# Patient Record
Sex: MALE | Race: WHITE | NOT HISPANIC OR LATINO | Employment: UNEMPLOYED | URBAN - METROPOLITAN AREA
[De-identification: names, ages, dates, MRNs, and addresses within clinical notes are randomized per-mention and may not be internally consistent; named-entity substitution may affect disease eponyms.]

---

## 2018-08-29 ENCOUNTER — TELEPHONE (OUTPATIENT)
Dept: BEHAVIORAL HEALTH | Facility: CLINIC | Age: 43
End: 2018-08-29

## 2018-08-29 NOTE — TELEPHONE ENCOUNTER
8-29-18 Scheduled cd eval mpls for client 9-5-18 @ [id number].  He is interested in Tx (IOP).  Crack, Cocaine, Meth  Hx cd tx, last time last Oct.  MH: none  Medical: none  Legal: none    Requested gisella. fb

## 2018-09-05 ENCOUNTER — HOSPITAL ENCOUNTER (OUTPATIENT)
Dept: BEHAVIORAL HEALTH | Facility: CLINIC | Age: 43
Discharge: HOME | End: 2018-09-05
Attending: SOCIAL WORKER | Admitting: SOCIAL WORKER
Payer: COMMERCIAL

## 2018-09-05 VITALS
HEART RATE: 86 BPM | DIASTOLIC BLOOD PRESSURE: 86 MMHG | HEIGHT: 73 IN | BODY MASS INDEX: 31.94 KG/M2 | WEIGHT: 241 LBS | SYSTOLIC BLOOD PRESSURE: 119 MMHG

## 2018-09-05 PROCEDURE — H0001 ALCOHOL AND/OR DRUG ASSESS: HCPCS

## 2018-09-05 ASSESSMENT — ANXIETY QUESTIONNAIRES
3. WORRYING TOO MUCH ABOUT DIFFERENT THINGS: MORE THAN HALF THE DAYS
7. FEELING AFRAID AS IF SOMETHING AWFUL MIGHT HAPPEN: NEARLY EVERY DAY
6. BECOMING EASILY ANNOYED OR IRRITABLE: NEARLY EVERY DAY
GAD7 TOTAL SCORE: 17
1. FEELING NERVOUS, ANXIOUS, OR ON EDGE: MORE THAN HALF THE DAYS
2. NOT BEING ABLE TO STOP OR CONTROL WORRYING: MORE THAN HALF THE DAYS
5. BEING SO RESTLESS THAT IT IS HARD TO SIT STILL: NEARLY EVERY DAY
4. TROUBLE RELAXING: MORE THAN HALF THE DAYS

## 2018-09-05 ASSESSMENT — PAIN SCALES - GENERAL: PAINLEVEL: NO PAIN (0)
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
## 2018-09-05 NOTE — TELEPHONE ENCOUNTER
"MARLEEAR  Name:   Eric Escobar YOB: 1975 Age:  43 year old Gender:  male   Insurance:   [employer]: PMAP. Paperwork was faxed to  for pre-auth today.   Precipitating Event:   Treatment due to own awareness of need for help, Treatment due to pressure from family members to get help, Treatment to avoid loss of relationship(s) and Treatment to avoid loss of living situation   DOC:   Cocaine/Crack  Additional abused substances:   Alcohol, Marijuana and Meth/Amphetamines   Medical:   heartburn   Mental Health:   History of trauma and/or abuse issues and Rule out bipolar and depression.   Previous Detox admissions & CD treatments:  4 prior IP detoxification admission(s).  13 prior CD treatments, completed all but one.   Psychosocial:  Single, in a serious relationship  3 adult child(nuha) and 1 minor child(nuha)  Homeless  Tendency to isolate from others, Marital or relationship conflict with significant other due to substance abuse, Relationship conflict with family members or friends due to substance abuse, History of legal issues, Unemployed and Significant debt   [place]ice Suicide Risk Status:    Past month:1. - Low Risk: Evaluation Counselors:  Document in Epic / [email or web address] to counselor \"Low Risk\".      Treatment Counselors:  Reassess upon admission as applicable, assess weekly in progress notes under Dimension 3 and summarize in Discharge / Treatment summary under Dimension 3.  Past 24 hours:1. - Low Risk: Evaluation Counselors:  Document in Epic / [email or web address] to counselor \"Low Risk\".      Treatment Counselors:  Reassess upon admission as applicable, assess weekly in progress notes under Dimension 3 and summarize in Discharge / Treatment summary under Dimension 3.     Additional Info as needed: There was no addtional information to provide at this time.  Please see the above suicide risk rating information.         "

## 2018-09-05 NOTE — TELEPHONE ENCOUNTER
LP SCREEN TELEPHONE NOTE   Eric VALENCIA Shawn paperwork was reviewed by KIRTI Rai and the patient was deemed ELIGIBLE for the LP program.     Medical: The patient is medically stable and did not appear to need a medical screeening with the LP RN at this time.     Insurance: [employer] [place] and we are waiting for [facility] to authorize 21 days of LP.  Eval was faxed to  today for pre-authorization.      This will be a: VA, ISP & LP UPDATE evaluation.     IV: This patient is not an IV drug user.     Business office: The patient has [employer] MA/PMAP and would NOT need to consult with anyone in the business office about the out of pocket costs of the LP program.     List: This patient CAN NOT be placed on the FACE to FACE LP Waiting List until after we have received the auth from .     Group: [facility] Group, preferably Group A     Additional Info as needed: NA     The best current contact telephone number for the patient is: [phone].       Thanks,  KIRTI Rai   9/5/2018
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
## 2018-09-06 ASSESSMENT — PATIENT HEALTH QUESTIONNAIRE - PHQ9: SUM OF ALL RESPONSES TO PHQ QUESTIONS 1-9: 19

## 2018-09-06 ASSESSMENT — ANXIETY QUESTIONNAIRES: GAD7 TOTAL SCORE: 17

## 2018-09-06 NOTE — TELEPHONE ENCOUNTER
LP bed available.  Eric will admit on Monday 9/10 at 1000.  Reminded to have a 30 day supply of all RX meds, all over the counter need to be unopened, also need to be clean and sober and not at risk for withdrawal.

## 2018-09-10 ENCOUNTER — HOSPITAL ENCOUNTER (OUTPATIENT)
Dept: BEHAVIORAL HEALTH | Facility: CLINIC | Age: 43
End: 2018-09-10
Attending: FAMILY MEDICINE
Payer: COMMERCIAL

## 2018-09-10 VITALS — HEART RATE: 85 BPM | TEMPERATURE: 98.2 F | DIASTOLIC BLOOD PRESSURE: 77 MMHG | SYSTOLIC BLOOD PRESSURE: 131 MMHG

## 2018-09-10 PROCEDURE — 10020000 ZZH LODGING PLUS FACILITY CHARGE ADULT

## 2018-09-10 PROCEDURE — H2035 A/D TX PROGRAM, PER HOUR: HCPCS | Mod: HQ

## 2018-09-10 PROCEDURE — H2035 A/D TX PROGRAM, PER HOUR: HCPCS

## 2018-09-10 RX ORDER — AMOXICILLIN 250 MG
2 CAPSULE ORAL DAILY PRN
COMMUNITY
End: 2018-10-05

## 2018-09-10 RX ORDER — MAGNESIUM HYDROXIDE/ALUMINUM HYDROXICE/SIMETHICONE 120; 1200; 1200 MG/30ML; MG/30ML; MG/30ML
30 SUSPENSION ORAL EVERY 6 HOURS PRN
COMMUNITY
End: 2018-10-05

## 2018-09-10 RX ORDER — LORATADINE 10 MG/1
10 TABLET ORAL DAILY PRN
COMMUNITY
End: 2018-10-05

## 2018-09-10 ASSESSMENT — ANXIETY QUESTIONNAIRES
5. BEING SO RESTLESS THAT IT IS HARD TO SIT STILL: NEARLY EVERY DAY
2. NOT BEING ABLE TO STOP OR CONTROL WORRYING: SEVERAL DAYS
6. BECOMING EASILY ANNOYED OR IRRITABLE: MORE THAN HALF THE DAYS
1. FEELING NERVOUS, ANXIOUS, OR ON EDGE: SEVERAL DAYS
7. FEELING AFRAID AS IF SOMETHING AWFUL MIGHT HAPPEN: NOT AT ALL
GAD7 TOTAL SCORE: 11
3. WORRYING TOO MUCH ABOUT DIFFERENT THINGS: NEARLY EVERY DAY
4. TROUBLE RELAXING: SEVERAL DAYS

## 2018-09-10 NOTE — PROGRESS NOTES
[facility] Nursing Health Assessment      Vital signs:     /77 (BP Location: Left arm)  Pulse 85  Temp 98.2  F (36.8  C)      Direct admission    Counselor: Ky  Drug of Choice: Crack cocaine, Meth   Last use: 9/2/18  Home clinic/MD:[facility], Dr Tidwell   [facility]     Medical history/current conditions: Acid reflux    H&P Screen:  H&P within the last 90 days: Yes.  Date: 6/7/18 Location: [facility]       Mental [facility] diagnosis: Depression and anxiety   Medication compliant?: N/A  Recent sucidal thoughts? No     When? N/A  Current thought of self-harm? No    Plan? N/A    Pain assessment:   Pt. Experiencing pain at this time?  Yes.  Rating on 0-10 scale: (1-10 scale): 7.  Location: lower back   Recent  Result of: working on car .       Nursing Assessment Summary:  Medically stable, no acute concern at this time    On-going nursing intervention required?   No    Acute care visit recommended: no     Cristina Herrera RN

## 2018-09-10 NOTE — PROGRESS NOTES
"  Progress Note       This patient had a comprehensive assessment on 9/5/2018 completed by TIA Dove.  This patient was seen for a face to face update of the comprehensive assessment on 9/10/2018 by KIRTI Rai:  Yes    Alcohol/Drug use since the last CD evaluation (include date of last use):     No additional substances use since the last CD evaluation     Please note any other clinical changes since the last CD evaluation (such as medication changes, additional legal charges, detoxification admissions, overdoses, etc.)     No significant changes since the last CD evaluation       ASAM Dimensions Original scores Current Scores   I.) Intoxication and Withdrawal: 0 0   II.) Biomedical:  0 0   III.) Emotional and Behavioral:  2 2   IV.) Readiness to Change:  1 1   V.) Relapse Potential: 4 4   VI.) Recovery Environmental: 3 3     Please list clinical justifications for the above ASAM score changes since the original comprehensive assessment:     None of the ASAM scores on the six dimensions had changed since the original comprehensive assessment was completed on 9/5/2018.       Current KASHMIR: Current UA:     .000     Positive for THC and negative for all other screened drugs.         PHQ-9, ELYSIA-7   PHQ-9 on 9/10/2018 ELYSIA-7 on 9/10/2018   The patient's PHQ-9 score was 12 out of 27, indicating moderate depression.     The patient's ELYSIA-7 score was 11 out of 21, indicating moderate anxiety.         [place]-Suicide Severity Rating Scale Reassessment   Have you ever wished you were dead or that you could go to sleep and not wake up?  Past Month:  No, not since he saw me on 9/5/2018   Have you actually had any thoughts of killing yourself?  Past Month:  No   Have you been thinking about how you might do this?     Past Month:  No Lifetime:  Yes, Describe: see below   Have you had these thoughts and had some intention of acting on them?     Past Month:  No Lifetime:  Yes, Describe: \"I was " "just going to get out of the car, and sit on the bench and use my .\" This is when Norma took him to the ED after she saw him playing with his . This was a few months ago.   Have you started to work out the details of how to kill yourself?   Past Month:  No Lifetime:  Yes, Describe: see above   Do you intend to carry out this plan?   No   When you have the thoughts how long do they last?  Fleeting - few seconds or minutes   Are there things - anyone or anything (i.e. family, Mosque, pain of death) that stopped you from wanting to die or acting on thoughts of suicide?  Protective factors probably stopped you     2008  [employer]  Used with permission by Serenity Ridley, PhD.       Guide to C-SSRS Risk Ratings   NO IDEATION:  with no active thoughts IDEATION: with a wish to die. IDEATION: with active thoughts. Risk Ratings   If Yes No No 0 - Very Low Risk   If NA Yes No 1 - Low Risk   If NA Yes Yes 2 - Low/moderate risk   IDEATION: associated thoughts of methods without intent or plan INTENT: Intent to follow through on suicide PLAN: Plan to follow through on suicide Risk Ratings cont...   If Yes No No 3 - Moderate Risk   If Yes Yes No 4 - High Risk   If Yes Yes Yes 5 - High Risk   The patient's ADDITIONAL RISK FACTORS and lack of PROTECTIVE FACTORS may increase their overall suicide risk ratings.     Additional Risk Factors:    Tendency to be socially isolated and/or cut off from the support of others     A recent death of someone close to the patient and/or unresolved grief and loss issues     Significant history of trauma and/or abuse issues     History of impulsive or aggressive behaviors   Protective Factors:    Having people in his/her life that would prevent the patient from considering a suicide attempt (i.e. young children, spouse, parents, etc.)     A positive relationship with his/her clinical medical and/or mental health providers     Having easy " "access to supportive family members     Having cultural, Catholic or spiritual beliefs that discourage suicide        Risk Status   Past month:1. - Low Risk: Evaluation Counselors:  Document in Epic / SBAR to counselor \"Low Risk\".      Treatment Counselors:  Reassess upon admission as applicable, assess weekly in progress notes under Dimension 3 and summarize in Discharge / Treatment summary under Dimension 3.     Past 24 hours:1. - Low Risk: Evaluation Counselors:  Document in Epic / SBAR to counselor \"Low Risk\".      Treatment Counselors:  Reassess upon admission as applicable, assess weekly in progress notes under Dimension 3 and summarize in Discharge / Treatment summary under Dimension 3.   Additional information to support suicide risk rating: There was no addtional information to provide at this time.  Please see the above suicide risk rating information.          "
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
## 2018-09-10 NOTE — PROGRESS NOTES
Initial Services Plan        Service Initiation Date: 9/10/2018    Immediate health and/or safety concerns: No    Identify health and safety concern(s) below and include plan to address:    Notify nurse if out of medical supplies.    Client issues to be addressed in the first treatment sessions:     Fear of being with strangers  Fear of adjusting to roommate or different environment  Fear of failing  Fear of being in a group and/or speaking in front of people    Treatment suggestions for client during the time between intake (admit date) and completion of the individual treatment plan:     Look for a sober support network, i.e. 12 step, [facility], [facility], etc  Tour the treatment center or outpatient clinic  Introduce yourself to your treatment group. Spend time getting to know your peers  Review your patient or client handbook  Begin working on your treatment goal list      Completed by: KIRTI Rai  Date completed: 9/10/2018 at 8:38 AM

## 2018-09-10 NOTE — PROGRESS NOTES
Name: Eric Escobar  Date: 9/10/2018  Medical Record: [id number]    Envelope Number: [id number]    List of Contents (List each item separately in new row):     One cell phone with case  One     Admission:  I am responsible for any personal items that are not sent to the safe or pharmacy.  [facility] is not responsible for loss, theft or damage of any property in my possession.      Patient Signature:  ___________________________________________       Date/Time:__________________________    Staff Signature: __________________________________       Date/Time:__________________________    2nd Staff person, if patient is unable/unwilling to sign:      __________________________________________________________       Date/Time: __________________________      Discharge:  [facility] has returned all of my personal belongings:    Patient Signature: ________________________________________     Date/Time: ____________________________________    Staff Signature: ______________________________________     Date/Time:_____________________________________

## 2018-09-11 ENCOUNTER — HOSPITAL ENCOUNTER (OUTPATIENT)
Dept: BEHAVIORAL HEALTH | Facility: CLINIC | Age: 43
End: 2018-09-11
Attending: FAMILY MEDICINE
Payer: COMMERCIAL

## 2018-09-11 DIAGNOSIS — Z71.6 ENCOUNTER FOR SMOKING CESSATION COUNSELING: Primary | ICD-10-CM

## 2018-09-11 PROBLEM — F19.20 CHEMICAL DEPENDENCY (H): Status: ACTIVE | Noted: 2018-09-11

## 2018-09-11 LAB — BARBITURATES UR QL: NEGATIVE

## 2018-09-11 PROCEDURE — H2035 A/D TX PROGRAM, PER HOUR: HCPCS

## 2018-09-11 PROCEDURE — H2035 A/D TX PROGRAM, PER HOUR: HCPCS | Mod: HQ

## 2018-09-11 PROCEDURE — 80299 QUANTITATIVE ASSAY DRUG: CPT | Performed by: FAMILY MEDICINE

## 2018-09-11 PROCEDURE — 10020000 ZZH LODGING PLUS FACILITY CHARGE ADULT

## 2018-09-11 PROCEDURE — 80307 DRUG TEST PRSMV CHEM ANLYZR: CPT | Performed by: FAMILY MEDICINE

## 2018-09-11 RX ORDER — POLYETHYLENE GLYCOL 3350 17 G
POWDER IN PACKET (EA) ORAL
Qty: 100 LOZENGE | Refills: 2 | Status: SHIPPED | OUTPATIENT
Start: 2018-09-11

## 2018-09-11 ASSESSMENT — PATIENT HEALTH QUESTIONNAIRE - PHQ9: SUM OF ALL RESPONSES TO PHQ QUESTIONS 1-9: 12

## 2018-09-11 ASSESSMENT — ANXIETY QUESTIONNAIRES: GAD7 TOTAL SCORE: 11

## 2018-09-12 ENCOUNTER — HOSPITAL ENCOUNTER (OUTPATIENT)
Dept: BEHAVIORAL HEALTH | Facility: CLINIC | Age: 43
End: 2018-09-12
Attending: FAMILY MEDICINE
Payer: COMMERCIAL

## 2018-09-12 PROCEDURE — H2035 A/D TX PROGRAM, PER HOUR: HCPCS | Mod: HQ

## 2018-09-12 PROCEDURE — 10020000 ZZH LODGING PLUS FACILITY CHARGE ADULT
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
## 2018-09-12 NOTE — PROGRESS NOTES
"Patient Safety Plan Template    Name:   Eric Escobar YOB: 1975 Age:  43 year old MR Number:  [id number]   Step 1: Warning signs (Thoughts, images, mood, situation, behavior) that a crisis may be developin. Irritable.     2. Anxious     3. Not talking.     Step 2: Internal coping strategies - Things I can do to take my mind off of my problems without contacting another person (relaxation technique, physical activity):     1. Reading     2. Meditating     3. Exercising.     Step 3: People and social settings that provide distraction:     1. Name: Eric Henderson   Phone: [phone]   2. Name: Norma Nelson   Phone: [phone]   3. Place: [place]   4. Place: Gym     The one thing that is most important to me and worth living for is: My partner, children and pets.     Step 4: People whom I can ask for help:     1. Name: Norma   Phone: [phone]     2. Name: Eric   Phone: [phone]     3. Name: Olivier   Phone: [phone]     Step 5: Professionals or agencies I can contact during a crisis:     1. Clinician Name: Greta (mental health therapist)   Phone: Patient reports phone number is stored in his cell phone.   Clinician Pager or Emergency Contact #: NA     2. Clinician Name: ADITI   Phone: ADITI     Clinician Pager or Emergency Contact #: NA     3. Local Urgent Care Services: [facility].    Urgent Care Services Address:     Urgent Care Services Phone: NA     4. Suicide Prevention Lifeline Phone: 5-424-042-WMMO (3075)     Step 6: Making the environment safe:     1. \"Being around sober people\"     2. \"Staying sober\"     Safety Plan Template 2008 Yoko Kirk and Alcon Brewster is reprinted with the express permission of the authors.  No portion of the Safety Plan Template may be reproduced without the express, written permission.  You can contact the authors at [email or web address] or juan [email or web address].     Patient denies any suicide and/or self " harm ideation at this time.

## 2018-09-13 ENCOUNTER — HOSPITAL ENCOUNTER (OUTPATIENT)
Dept: BEHAVIORAL HEALTH | Facility: CLINIC | Age: 43
End: 2018-09-13
Attending: FAMILY MEDICINE
Payer: COMMERCIAL

## 2018-09-13 PROCEDURE — H2035 A/D TX PROGRAM, PER HOUR: HCPCS | Mod: HQ

## 2018-09-13 PROCEDURE — H2035 A/D TX PROGRAM, PER HOUR: HCPCS

## 2018-09-13 PROCEDURE — 10020000 ZZH LODGING PLUS FACILITY CHARGE ADULT

## 2018-09-14 ENCOUNTER — HOSPITAL ENCOUNTER (OUTPATIENT)
Dept: BEHAVIORAL HEALTH | Facility: CLINIC | Age: 43
End: 2018-09-14
Attending: FAMILY MEDICINE
Payer: COMMERCIAL

## 2018-09-14 LAB
BUPRENORPHINE GLUCURONIDE URINE: <5 NG/ML
BUPRENORPHINE UR CFM-MCNC: <2 NG/ML
NALOXONE URINE: <100 NG/ML
NORBUPRENORPHINE GLUCURONIDE URINE: <5 NG/ML
NORBUPRENORPHINE UR CFM-MCNC: <2 NG/ML

## 2018-09-14 PROCEDURE — H2035 A/D TX PROGRAM, PER HOUR: HCPCS

## 2018-09-14 PROCEDURE — 10020000 ZZH LODGING PLUS FACILITY CHARGE ADULT

## 2018-09-14 PROCEDURE — H2035 A/D TX PROGRAM, PER HOUR: HCPCS | Mod: HQ

## 2018-09-14 NOTE — PROGRESS NOTES
AFTERCARE NOTE  9/14/18    Writer met with patient today to discuss his aftercare plans. Pt stated that his previous approach after treatment of putting employment before his recovery has never worked. Pt shared he is open to looking at both residential and out-patient care. I spoke to pt about both the [facility] and [facility]'s residential programs. Pt stated he would like to take a couple days to think about his options and get back to me. Updates to follow.    Bebeto Tucker/ [facility]   [facility]

## 2018-09-15 ENCOUNTER — HOSPITAL ENCOUNTER (OUTPATIENT)
Dept: BEHAVIORAL HEALTH | Facility: CLINIC | Age: 43
End: 2018-09-15
Attending: FAMILY MEDICINE
Payer: COMMERCIAL

## 2018-09-15 PROCEDURE — 10020000 ZZH LODGING PLUS FACILITY CHARGE ADULT

## 2018-09-15 PROCEDURE — H2035 A/D TX PROGRAM, PER HOUR: HCPCS

## 2018-09-16 ENCOUNTER — HOSPITAL ENCOUNTER (OUTPATIENT)
Dept: BEHAVIORAL HEALTH | Facility: CLINIC | Age: 43
End: 2018-09-16
Attending: FAMILY MEDICINE
Payer: COMMERCIAL

## 2018-09-16 PROCEDURE — 10020000 ZZH LODGING PLUS FACILITY CHARGE ADULT

## 2018-09-16 PROCEDURE — H2035 A/D TX PROGRAM, PER HOUR: HCPCS | Mod: HQ

## 2018-09-17 ENCOUNTER — HOSPITAL ENCOUNTER (OUTPATIENT)
Dept: BEHAVIORAL HEALTH | Facility: CLINIC | Age: 43
End: 2018-09-17
Attending: FAMILY MEDICINE
Payer: COMMERCIAL

## 2018-09-17 PROCEDURE — H2035 A/D TX PROGRAM, PER HOUR: HCPCS | Mod: HQ

## 2018-09-17 PROCEDURE — 10020000 ZZH LODGING PLUS FACILITY CHARGE ADULT

## 2018-09-17 PROCEDURE — H2035 A/D TX PROGRAM, PER HOUR: HCPCS

## 2018-09-17 NOTE — TELEPHONE ENCOUNTER
A phone call was made as follow-up to the Family Program mailing for the week of 09/24/2018. Message left or spoke in person to individuals on BENSON.

## 2018-09-17 NOTE — PROGRESS NOTES
Acknowledgement of Current Treatment Plan - Initial Treatment Plan     INITIAL TREATMENT PLAN:     1. I have participated in creating my treatment plan with my counselor on September 17, 2018.  I agree with the plan as it is written in the electronic health record.    Name Signature/Date   Eric ERIK Escobar        Name of  Counselor     Signature/Date   KIRTI Merritt      2. I have completed and reviewed my Safety Plan with my counselor and signed this on _________. I have been given the hard copy of this plan.    Patient signature/date:      _____________________________________________________________________________    3. Last Use Date: __________    Patient signature/date:     _____________________________________________________________________________                                                    Acknowledgement of Current Treatment Plan - Additional Entries       ADDITIONAL GOALS AND INTERVENTIONS:    Signatures/dates required for any additional Problems, Goals, and/or Interventions added to treatment plan:  Change/Addition in Dimension ____ on date:_________  Insert here:         I have participated in creating this change and/or addition to my treatment plan copied above.   I have been given a copy of this signature page with change/addition to my treatment plan and I am in agreement with how it is written in the electronic record.       Patient signature:       ________________________________________________________________________      Counselor/Therapist signature:    ________________________________________________________________              Change in date of last use:       ________________________________________________________________        Patient signature/date (required for change in last use date):     ________________________________________________________________
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
## 2018-09-18 ENCOUNTER — HOSPITAL ENCOUNTER (OUTPATIENT)
Dept: BEHAVIORAL HEALTH | Facility: CLINIC | Age: 43
End: 2018-09-18
Attending: FAMILY MEDICINE
Payer: COMMERCIAL

## 2018-09-18 PROCEDURE — H2035 A/D TX PROGRAM, PER HOUR: HCPCS

## 2018-09-18 PROCEDURE — 10020000 ZZH LODGING PLUS FACILITY CHARGE ADULT

## 2018-09-18 PROCEDURE — H2035 A/D TX PROGRAM, PER HOUR: HCPCS | Mod: HQ

## 2018-09-19 ENCOUNTER — HOSPITAL ENCOUNTER (OUTPATIENT)
Dept: BEHAVIORAL HEALTH | Facility: CLINIC | Age: 43
End: 2018-09-19
Attending: FAMILY MEDICINE
Payer: COMMERCIAL

## 2018-09-19 PROCEDURE — 10020000 ZZH LODGING PLUS FACILITY CHARGE ADULT

## 2018-09-19 PROCEDURE — H2035 A/D TX PROGRAM, PER HOUR: HCPCS | Mod: HQ

## 2018-09-19 PROCEDURE — H2035 A/D TX PROGRAM, PER HOUR: HCPCS

## 2018-09-19 NOTE — PROGRESS NOTES
AFTERCARE UPDATE  9/19/18    Per patient's request, pt's clinicals were faxed today to the [facility] program. I await their response.    Bebeto Tucker/ [facility]   [facility]
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
## 2018-09-20 ENCOUNTER — HOSPITAL ENCOUNTER (OUTPATIENT)
Dept: BEHAVIORAL HEALTH | Facility: CLINIC | Age: 43
End: 2018-09-20
Attending: FAMILY MEDICINE
Payer: COMMERCIAL

## 2018-09-20 PROCEDURE — 10020000 ZZH LODGING PLUS FACILITY CHARGE ADULT

## 2018-09-20 PROCEDURE — H2035 A/D TX PROGRAM, PER HOUR: HCPCS

## 2018-09-20 PROCEDURE — H2035 A/D TX PROGRAM, PER HOUR: HCPCS | Mod: HQ

## 2018-09-20 NOTE — PROGRESS NOTES
AFTERCARE UPDATE  9/20/18     RE: Eric KENNEDY- Writer met with patient today for any aftercare updates pt might have. Pt stated he is looking at out-patient care and is considering pursuing this through [facility]. I gave pt the [facility] out-patient schedule and asked him to consider his options. I also asked pt to meet with his primary counselors on 9/21/18 to give them updates and get their feedback. Pt stated he would do this.    Bebeto Tucker/ [facility]   [facility]

## 2018-09-21 ENCOUNTER — HOSPITAL ENCOUNTER (OUTPATIENT)
Dept: BEHAVIORAL HEALTH | Facility: CLINIC | Age: 43
End: 2018-09-21
Attending: FAMILY MEDICINE
Payer: COMMERCIAL

## 2018-09-21 PROCEDURE — 10020000 ZZH LODGING PLUS FACILITY CHARGE ADULT

## 2018-09-21 PROCEDURE — H2035 A/D TX PROGRAM, PER HOUR: HCPCS

## 2018-09-21 PROCEDURE — H2035 A/D TX PROGRAM, PER HOUR: HCPCS | Mod: HQ

## 2018-09-21 NOTE — PROGRESS NOTES
CASE MANAGEMENT NOTE    This writer met with patient regarding aftercare recommendations.  Patient reports [facility] is not an option and patient reported an interest in [facility] in [place], [place].  Patient signed a release of information for [facility] and this writer sent information to Admission Department.    KIRTI Merritt

## 2018-09-21 NOTE — PROGRESS NOTES
Patient:  Eric Escobar            Adult CD Progress Note and Treatment Plan Review     Attendance  Please refer to OP BEH CD Adult Attendance Record Documentation Flowsheet    Support group attended this week: yes    Reporting sobriety:  yes    Treatment Plan     Treatment Plan Review competed on: 9/21/2018       Client preferred learning style: Hands on  Verbal  Reading    Staff Members contributing MICHELLE Merritt & KIRTI Sher                     Received Supervision: No    Client: contributed to goals and plan.    Client received copy of plan/revised plan: Yes    Client agrees with plan/revised plan: Yes        Changes to Treatment Plan: No    New Goals added since last review No additional goals added at this time.    Goals worked on since last review Patient completed and presented his initial assignment 'Drug Progression' in group processes.    Strategies effective: yes    .Strategies need these changes: No additional strategies needed at this time/    1) Care Coordination Activities:  Patient is working with counseling staff to establish aftercare recommendations.  2) Medical, Mental Health and other appointments the client attended: Patient met with staff mental health therapist.  3) Medication issues: No issues to address at this time.  4) Physical and mental health problems: No issues to address at this time.  5) Any changes in Vulnerable Adult Status?  No If yes, add to treatment plan and individual abuse prevention plan.  6) Review and evaluation of the individual abuse prevention plan: Current IAPP for this program is adequate for this client          ASAM Risk Rating:    Dimension 1 0 Patient reports a last use date of 9/2/2018.  No issues to address at this time.    Dimension 2 0 Patient denies any biomedical concerns that would interfere with full participation in group and treatment processes.  Patient reports he is prescribed medications and is currently  medication compliant.  Patient attended weekly nursing lecture on 0/16/2018.  Patient is covered under health insurance and reports he is able to navigate the health care system independently.      Dimension 3 2 Patient reports a previous mental health diagnosis of Bipolar, however, patient reports it may have been chemical induced depression.  Patient met with [facility] mental health therapist for an initial session, reporting he will continue to do so while in [facility] programming.  Patient denies any suicide and/or self harm ideation at this time.  Patient has begun to share his emotions and able to open up in small group sessions.  Patient is also working to journal his emotions consistently.  Patient I s working on assignment 'Self-Sabotage' and will present in small group session.  Patient is working to identify the defenses he uses and identify healthy behaviors in recovery.  Patient attended  led spirituality group on 9/19/2018.    Dimension 4 1 Patient verbalizes his willingness to follow treatment recommendations with active reinforcement from his family.  Patient completed and presented his initial assignment 'drug progression' in small group session.  Patient attends all required programming and is an active participant in group and treatment processes.    Dimension 5 4 Patient reports an excessive use history, reporting about 14 previous treatment attempts.  Patient is working to gain an understanding of the relationship between his unmanaged emotional health, mental health and his continued substance use at this time.  Patient is also working to identify problematic thinking and behaviors that have contributed to his continued use.  Patient attended relapse prevention workshop on 9/15/2018.    Dimension 6 4 Patient is working with [facility] counseling staff to explore residential CD treatment options as a continnuum of care.  Patient attended sober support groups this week.  Patient  signed releases of information to invite his significant other and children to family week offered by [facility] programming.  Patient is currently unemployed and lacks a strong sober network at this time.      Guide to Risk Ratings for Suicidality:   IDEATION: Active thoughts of suicide? INTENT: Intent to follow on suicide? PLAN: Plan to follow through on suicide? Level of Risk:   IF Yes Yes Yes Patient = High Emergent   IF Yes Yes No Patient = High Urgent/Non-Emergent   IF Yes No No Patient = Moderate Non-Urgent   IF No No   No Patient = Low Risk   The patient's ADDITIONAL RISK FACTORS and lack of PROTECTIVE FACTORS may increase their overall suicide risk ratings.     Patient's/client's current risk rating:  Low Risk    Family Involvement:   BENSON signed    Data:   offered feedback client did actively participate    Intervention:   Aftercare planning  Behavior modification  Counselor feedback  Education  Emotional management  Group feedback  Relapse prevention      Assessment:   Stages of Change Model  Preparation/Determination    Appears/Sounds:  Cooperative  Engaged      Plan:  Monitor emotional/physical health  Continue to work with counseling staff regarding aftercare.  Continue to meet with [facility] mental health therapist.  Continue to work towards treatment plans.    KIRTI Merritt

## 2018-09-22 ENCOUNTER — HOSPITAL ENCOUNTER (OUTPATIENT)
Dept: BEHAVIORAL HEALTH | Facility: CLINIC | Age: 43
End: 2018-09-22
Attending: FAMILY MEDICINE
Payer: COMMERCIAL

## 2018-09-22 PROCEDURE — H2035 A/D TX PROGRAM, PER HOUR: HCPCS

## 2018-09-22 PROCEDURE — 10020000 ZZH LODGING PLUS FACILITY CHARGE ADULT

## 2018-09-23 ENCOUNTER — HOSPITAL ENCOUNTER (OUTPATIENT)
Dept: BEHAVIORAL HEALTH | Facility: CLINIC | Age: 43
End: 2018-09-23
Attending: FAMILY MEDICINE
Payer: COMMERCIAL

## 2018-09-23 PROCEDURE — 10020000 ZZH LODGING PLUS FACILITY CHARGE ADULT

## 2018-09-23 PROCEDURE — H2035 A/D TX PROGRAM, PER HOUR: HCPCS | Mod: HQ

## 2018-09-24 ENCOUNTER — HOSPITAL ENCOUNTER (OUTPATIENT)
Dept: BEHAVIORAL HEALTH | Facility: CLINIC | Age: 43
End: 2018-09-24
Attending: FAMILY MEDICINE
Payer: COMMERCIAL

## 2018-09-24 DIAGNOSIS — Z71.6 ENCOUNTER FOR SMOKING CESSATION COUNSELING: Primary | ICD-10-CM

## 2018-09-24 PROCEDURE — H2035 A/D TX PROGRAM, PER HOUR: HCPCS | Mod: HQ

## 2018-09-24 PROCEDURE — H2035 A/D TX PROGRAM, PER HOUR: HCPCS

## 2018-09-24 PROCEDURE — 10020000 ZZH LODGING PLUS FACILITY CHARGE ADULT

## 2018-09-24 RX ORDER — NICOTINE 21 MG/24HR
1 PATCH, TRANSDERMAL 24 HOURS TRANSDERMAL EVERY 24 HOURS
Qty: 30 PATCH | Refills: 1 | Status: SHIPPED | OUTPATIENT
Start: 2018-09-24

## 2018-09-24 NOTE — PROGRESS NOTES
Name: Eric Escobar  Date: 9/24/2018  Medical Record: [id number]    Envelope Number: [id number]    List of Contents (List each item separately in new row):   Nicotine Polacrilex 2mg Lozg(Qty 72)    Admission:  I am responsible for any personal items that are not sent to the safe or pharmacy.  [facility] is not responsible for loss, theft or damage of any property in my possession.      Patient Signature:  ___________________________________________       Date/Time:__________________________    Staff Signature: __________________________________       Date/Time:__________________________    2nd Staff person, if patient is unable/unwilling to sign:      __________________________________________________________       Date/Time: __________________________      Discharge:  [facility] has returned all of my personal belongings:    Patient Signature: ________________________________________     Date/Time: ____________________________________    Staff Signature: ______________________________________     Date/Time:_____________________________________

## 2018-09-25 ENCOUNTER — HOSPITAL ENCOUNTER (OUTPATIENT)
Dept: BEHAVIORAL HEALTH | Facility: CLINIC | Age: 43
End: 2018-09-25
Attending: FAMILY MEDICINE
Payer: COMMERCIAL

## 2018-09-25 PROCEDURE — H2035 A/D TX PROGRAM, PER HOUR: HCPCS | Mod: HQ

## 2018-09-25 PROCEDURE — 10020000 ZZH LODGING PLUS FACILITY CHARGE ADULT

## 2018-09-25 PROCEDURE — H2035 A/D TX PROGRAM, PER HOUR: HCPCS

## 2018-09-25 NOTE — PROGRESS NOTES
"INDIVIDUAL SESSION SUMMARY    D) Met with client on 9/25/18 from 9:00-9:45. Client reported that he is interested in [place] for aftercare programming and another 90 days of a \"solid foundation\" before he returns home. Client spoke about the risk he perceives this decision to be to his relationship and how his significant other may decide to end the relationship if he moves into sober living. Client stated that he and his GF struggle with communication and that he feels she has an \"all or nothing\" aditude. Client spoke about his wish that his GF would work on herself while he is in sober living rather than waiting to see if he fails at sobriety. Client stated \"I'm tired\" and \"I can't keep doing this\" and stated that he had \"zero confidence\" in himself if he were to return home right away. Client stated \"I need structure and routine and to focus on my recovery\". Client discussed how if he returned home he would be able to help out with bills in the short-term but his fear is that he would return to using. Client and therapist discussed how a longer treatment program would give the client an opportunity to re-build trust with himself. Client stated that he will contact his old therapist, Greta Muller, to resume sessions.     I) Individual session with client. Provided client with verbal interventions including: validation, nurturing, compassion and support. Discussed the benefits of: healthy boundaries, assertive communication and asking for what he needs versus putting other people's needs first. Therapist discussed how couples counseling int he future could be helpful for the reactivity and communication problems that the client has with his GF.     A) Client appears to have experienced early attachment disruption, resulting in lack of trust, loss of safety, fear of abandonment, and symptoms of co-dependency. Client appears emotionally constricted and to lack skills for emotional regulation and stress " management. Client appears to lack a sober support network. Client appears to have insight into what he needs to do for recovery but is struggling to ask for what he needs from his relationship.     P) Next session is scheduled for 10/2/18.   MARLA Tamayo  9/25/2018

## 2018-09-26 ENCOUNTER — HOSPITAL ENCOUNTER (OUTPATIENT)
Dept: BEHAVIORAL HEALTH | Facility: CLINIC | Age: 43
End: 2018-09-26
Attending: FAMILY MEDICINE
Payer: COMMERCIAL

## 2018-09-26 PROCEDURE — H2035 A/D TX PROGRAM, PER HOUR: HCPCS | Mod: HQ

## 2018-09-26 PROCEDURE — 10020000 ZZH LODGING PLUS FACILITY CHARGE ADULT

## 2018-09-26 NOTE — PROGRESS NOTES
CASE MANAGEMENT NOTE      LATE NOTE ENTRY:    Per call from Bebeto at [facility] on 9/25/2018, patient is scheduled for phone interview on Thursday, 9/27/2018 @ 12:30.  [phone].  Patient notified.    [facility]

## 2018-09-26 NOTE — PROGRESS NOTES
Patient:  Eric Escobar            Adult CD Progress Note and Treatment Plan Review     Attendance  Please refer to OP BEH CD Adult Attendance Record Documentation Flowsheet    Support group attended this week: yes    Reporting sobriety:  yes    Treatment Plan     Treatment Plan Review competed on: 9/26/18       Client preferred learning style: Hands on  Verbal  Reading    Staff Members contributing KIRTI Merritt & KIRTI Sher                     Received Supervision: No    Client: contributed to goals and plan.    Client received copy of plan/revised plan: Yes    Client agrees with plan/revised plan: Yes        Changes to Treatment Plan: No    New Goals added since last review No additional goals added at this time.     Goals worked on since last review Patient completed assignment 'Body Map' and will present in group on 9/28/2018.      Strategies effective: yes    Strategies need these changes: No additional strategies needed at this time.    1) Care Coordination Activities:  Patient is working with counseling staff regarding aftercare recommendations.  Patient has a scheduled phone interview with [facility] CD treatment on 9/27/2018.  2) Medical, Mental Health and other appointments the client attended: Patient met with [facility] staff mental health therapist this week.  3) Medication issues: No issues to address at this time.  4) Physical and mental health problems: No issues to address at this time.  5) Any changes in Vulnerable Adult Status?  Yes If yes, add to treatment plan and individual abuse prevention plan.  6) Review and evaluation of the individual abuse prevention plan: Current IAPP for this program is adequate for this client          ASAM Risk Rating:    Dimension 1 0 Patient reports a last use date 9/2/2018.  No issues to address at this time.     Dimension 2 0 Patient denies any biomedical concerns that would interfere with full participation in group and  treatment processes.  Patient is prescribed medications and is currently medication compliant.  Patient attended weekly nursing lecture on 9/23/2018.  Patient is covered under health insurance and reports he is able to navigate the health care system independently.      Dimension 3 2 Patient reports a previous mental health diagnosis of Bipolar, however, patient reports he is unsure of the accuracy of this diagnosis.  Patient denies any suicide and/or self harm ideation at this time.  Patient continues to meet with staff mental health therapist, reporting he will continue to do so while in [facility] programming.  Patient reports he will contact established mental health therapist in the community as a continuum of care.  Patient presented assignment 'self sabotage' and continues to identify unhealthy behaviors and identify healthy behavior, boundaries, and thought patterns in recovery.  Patient also continues to identify the defenses he uses, including anger.  Patient completes a daily goal journal daily and shares in small group sessions.  Patient is working on assignment 'Understanding Family History' and will present in group.  Patient attended  led spirituality group on 9/26/2018.    Dimension 4 1 Patient verbalizes his willingness to follow treatment recommendations with active reinforcement from his family.  Patient attends all required programming and is an active participant in group and treatment processes.  Patient continues to work towards treatment plan goals and is exploring step-down residential CD treatment options as a continuum of care.      Dimension 5 4 Patient reports an extensive use history, reporting about 14 previous treatment attempts.  Patient continues to work to gain an understanding of his unmanaged emotions and continues to identify strategies to manage them, including journaling.  Patient is also working to utilize assertive communication skills he has learned.  Patient  appears to be at a high risk for continued substance use at this time.    Dimension 6 4 Patient is working with counseling staff to establish aftercare plans, such as step-down residential CD treatment programming at [facility].  Patient attended family programming offered by [facility] with his significant other and reports he will participate in an couple session with family therapist also.  Patient attended sober support groups this week and attended relationships workshop on 9/22/2018.  Patient is currently unemployed and lacks a strong sober network at this time.       Guide to Risk Ratings for Suicidality:   IDEATION: Active thoughts of suicide? INTENT: Intent to follow on suicide? PLAN: Plan to follow through on suicide? Level of Risk:   IF Yes Yes Yes Patient = High Emergent   IF Yes Yes No Patient = High Urgent/Non-Emergent   IF Yes No No Patient = Moderate Non-Urgent   IF No No   No Patient = Low Risk   The patient's ADDITIONAL RISK FACTORS and lack of PROTECTIVE FACTORS may increase their overall suicide risk ratings.     Patient's/client's current risk rating:  Low Risk    Family Involvement:   Patient participated in family programming this week.    Data:   offered feedback client did actively participate      Intervention:   Aftercare planning  Behavior modification  Counselor feedback  Education  Emotional management  Group feedback  Relapse prevention  Mental health education      Assessment:   Stages of Change Model  Preparation/Determination    Appears/Sounds:  Cooperative  Engaged      Plan:  Focus on recovery environment  Monitor emotional/physical health  Continue to meet with staff mental health therapist for individual sessions.  Continue to work with counseling staff regarding aftercare.    KIRTI Merritt

## 2018-09-27 ENCOUNTER — HOSPITAL ENCOUNTER (OUTPATIENT)
Dept: BEHAVIORAL HEALTH | Facility: CLINIC | Age: 43
End: 2018-09-27
Attending: FAMILY MEDICINE
Payer: COMMERCIAL

## 2018-09-27 PROCEDURE — 10020000 ZZH LODGING PLUS FACILITY CHARGE ADULT

## 2018-09-27 PROCEDURE — H2035 A/D TX PROGRAM, PER HOUR: HCPCS

## 2018-09-27 PROCEDURE — H2035 A/D TX PROGRAM, PER HOUR: HCPCS | Mod: HQ

## 2018-09-27 NOTE — PROGRESS NOTES
D) Pt shared detailed  chemical use history and listened as family shared their feelings of abandonment when pt leaves her for other women. Day 2 pt shared feeling sad when girl friend threatens to leave him. A) Pt seems to be seeking support from family system.  Everyone understands the importance of good communication focused on sharing feelings.  P) Pt to follow counselors' recommendations.  Family to attend Phase 2/Poli .

## 2018-09-28 ENCOUNTER — HOSPITAL ENCOUNTER (OUTPATIENT)
Dept: BEHAVIORAL HEALTH | Facility: CLINIC | Age: 43
End: 2018-09-28
Attending: FAMILY MEDICINE
Payer: COMMERCIAL

## 2018-09-28 PROCEDURE — H2035 A/D TX PROGRAM, PER HOUR: HCPCS

## 2018-09-28 PROCEDURE — H2035 A/D TX PROGRAM, PER HOUR: HCPCS | Mod: HQ

## 2018-09-28 PROCEDURE — 10020000 ZZH LODGING PLUS FACILITY CHARGE ADULT

## 2018-09-29 ENCOUNTER — HOSPITAL ENCOUNTER (OUTPATIENT)
Dept: BEHAVIORAL HEALTH | Facility: CLINIC | Age: 43
End: 2018-09-29
Attending: FAMILY MEDICINE
Payer: COMMERCIAL

## 2018-09-29 ENCOUNTER — TRANSFERRED RECORDS (OUTPATIENT)
Dept: HEALTH INFORMATION MANAGEMENT | Facility: CLINIC | Age: 43
End: 2018-09-29

## 2018-09-29 PROCEDURE — H2035 A/D TX PROGRAM, PER HOUR: HCPCS

## 2018-09-29 PROCEDURE — 10020000 ZZH LODGING PLUS FACILITY CHARGE ADULT

## 2018-09-30 ENCOUNTER — HOSPITAL ENCOUNTER (OUTPATIENT)
Dept: BEHAVIORAL HEALTH | Facility: CLINIC | Age: 43
End: 2018-09-30
Attending: FAMILY MEDICINE
Payer: COMMERCIAL

## 2018-09-30 PROCEDURE — 10020000 ZZH LODGING PLUS FACILITY CHARGE ADULT

## 2018-09-30 PROCEDURE — H2035 A/D TX PROGRAM, PER HOUR: HCPCS | Mod: HQ

## 2018-10-01 ENCOUNTER — HOSPITAL ENCOUNTER (OUTPATIENT)
Dept: BEHAVIORAL HEALTH | Facility: CLINIC | Age: 43
End: 2018-10-01
Attending: FAMILY MEDICINE
Payer: COMMERCIAL

## 2018-10-01 PROCEDURE — 25000125 ZZHC RX 250

## 2018-10-01 PROCEDURE — H2035 A/D TX PROGRAM, PER HOUR: HCPCS

## 2018-10-01 PROCEDURE — H2035 A/D TX PROGRAM, PER HOUR: HCPCS | Mod: HQ

## 2018-10-01 PROCEDURE — 10020000 ZZH LODGING PLUS FACILITY CHARGE ADULT

## 2018-10-01 NOTE — PROGRESS NOTES
Late Note Entry:    On 9/27/2018 message from Bebeto at [facility] reporting patient has been accepted to [facility] CD Treatment program and patient would need to get a mantoux.  Bebeto was unsure of admission date, reporting he would call back the week of 10/1/2018 to confirm date.  Patient notified.    MICHELLE Merritt

## 2018-10-02 ENCOUNTER — HOSPITAL ENCOUNTER (OUTPATIENT)
Dept: BEHAVIORAL HEALTH | Facility: CLINIC | Age: 43
End: 2018-10-02
Attending: FAMILY MEDICINE
Payer: COMMERCIAL

## 2018-10-02 PROCEDURE — H2035 A/D TX PROGRAM, PER HOUR: HCPCS | Mod: HQ

## 2018-10-02 PROCEDURE — 10020000 ZZH LODGING PLUS FACILITY CHARGE ADULT

## 2018-10-02 NOTE — PROGRESS NOTES
CASE MANAGEMENT NOTE    Per call to Bebeto at [facility], patient is scheduled to transition to [facility] Treatment program on 10/8/2018.    KIRTI Merritt

## 2018-10-02 NOTE — PROGRESS NOTES
Met with client on 10/2/18 from 9:00-9:15am. Client and therapist discussed the agenda for Thursday's couples session with his significant other. Therapist encouraged client to continue to be clear with his s.o. about his aftercare plans and to work on healthy communication skills.   MARLA Tamayo

## 2018-10-03 ENCOUNTER — HOSPITAL ENCOUNTER (OUTPATIENT)
Dept: BEHAVIORAL HEALTH | Facility: CLINIC | Age: 43
End: 2018-10-03
Attending: FAMILY MEDICINE
Payer: COMMERCIAL

## 2018-10-03 ENCOUNTER — TRANSFERRED RECORDS (OUTPATIENT)
Dept: HEALTH INFORMATION MANAGEMENT | Facility: CLINIC | Age: 43
End: 2018-10-03

## 2018-10-03 PROCEDURE — H2035 A/D TX PROGRAM, PER HOUR: HCPCS | Mod: HQ

## 2018-10-03 PROCEDURE — 10020000 ZZH LODGING PLUS FACILITY CHARGE ADULT

## 2018-10-03 NOTE — PROGRESS NOTES
CASE MANAGEMENT NOTE    Auth request sent to  for patient to transition to [place].    KIRTI Merritt

## 2018-10-04 ENCOUNTER — HOSPITAL ENCOUNTER (OUTPATIENT)
Dept: BEHAVIORAL HEALTH | Facility: CLINIC | Age: 43
End: 2018-10-04
Attending: FAMILY MEDICINE
Payer: COMMERCIAL

## 2018-10-04 PROCEDURE — 10020000 ZZH LODGING PLUS FACILITY CHARGE ADULT

## 2018-10-04 PROCEDURE — H2035 A/D TX PROGRAM, PER HOUR: HCPCS

## 2018-10-04 PROCEDURE — H2035 A/D TX PROGRAM, PER HOUR: HCPCS | Mod: HQ

## 2018-10-04 NOTE — PROGRESS NOTES
INDIVIDUAL SESSION SUMMARY    D) Met with client on 10/4/18 from 4:00-5:00 along with his significant other. Client listened as he heard his significant other share her fears about him transferring to an extended treatment program for another 60 days. Client spoke of his fears about returning home and not feeling confident in his healthy communication skills. Couple spoke about the options available for out-patient programming if the client were to return home immediately versus going to the 60 day extended treatment program. Couple spoke about past hurts that they don't want to repeat and negative communication patterns that they want to break. Couple agreed to touch base by phone in the morning and that the client would share his decision about where he will go for continued care.      I) Couples session with client and significant other. Therapist pointed out negative communication patterns including contempt, belittling comments and threats to end the relationship. Therapist encouraged couple to make a commitment to working on the relationship and not threaten to leave the other when they are fighting. Therapist encouraged both to make decisions that are in the best interest of their own wellbeing and not to please the other.     A) Couple appear to have many hurtful experiences in their years together. Couple is struggling to take care of their own needs while trying to please the other person. Client appears fearful of repeating past mistakes and is not yet confident in his ability to persist through hard times while staying sober. Client struggles to have a voice and speak up for himself.     P) No future sessions scheduled. Client will resume sessions with his therapist Greta Muller, at [facility] in [place]. This therapist provided the client Greta's office number to call and schedule.     MARLA Tamayo  10/4/2018

## 2018-10-04 NOTE — PROGRESS NOTES
Patient:  Eric Escobar            Adult CD Progress Note and Treatment Plan Review     Attendance  Please refer to OP BEH CD Adult Attendance Record Documentation Flowsheet    Support group attended this week: yes    Reporting sobriety:  yes    Treatment Plan     Treatment Plan Review competed on: 10/4/2018       Client preferred learning style: Hands on  Verbal  Reading    Staff Members contributing KIRTI Merritt & KIRTI Sher                     Received Supervision: No    Client: contributed to goals and plan.    Client received copy of plan/revised plan: Yes    Client agrees with plan/revised plan: Yes        Changes to Treatment Plan: No    New Goals added since last review No additional goals added at this time.    Goals worked on since last review Patient completed assignments directly related to anger, relapse prevention, and adult children of alcoholics assignments.    Strategies effective: yes    Strategies need these changes: No additional strategies needed at this time.    1) Care Coordination Activities:  Patient has been accepted to [facility] CD Treatment program with an admission date of 10/8/2018.  2) Medical, Mental Health and other appointments the client attended: Patient attended session with [facility] staff mental health therapist this week.  3) Medication issues: No issues to address at this time.  4) Physical and mental health problems: No issues to address at this time.  5) Any changes in Vulnerable Adult Status?  No If yes, add to treatment plan and individual abuse prevention plan.  6) Review and evaluation of the individual abuse prevention plan: Current IAPP for this program is adequate for this client          ASAM Risk Rating:    Dimension 1 0 Patient reports a last use date of 9/2/2018.  No issues to address at this time.    Dimension 2 2 Patient denies any biomedical concerns that would interfere with full  "participation in group and treatment processes.  Patient is prescribed medications and is currenlty medicaton compliant.  Patient is covered under health insurance and reports he is able to navigate the health care system independenly.  Patient attended weekly nursing lecture on 9/30/2018.    Dimension 3 2 Patient denies experiencing any mental health symptoms at this time.  Patient continues to meet with [facility] mental health therapist for individual sessions, reporting he will follow up with mental health therapist int he community as a continuum of care.  Patient denies any suicide and/or self harm ideation at this time.  Patient completed assignments directly related to anger and continues to identify his anger as a secondary emotion and understand the underlying emotion he is feeling, in addition, patient completed assignment \"Adult Children of Alcoholics\".  Patient also continues to identify negative thinking patterns and identify healthy patterns in recovery.  Patient continues to work through feelings of regret, shame, and guilt.  Patient continues to complete a daily goal journal and share in small group session.  Patient attended  led spirituality group on 10/3/2018.    Dimension 4 1 Patient verbalizes his willingness to follow treatment recommendations with active reinforcement from his family.  Patient attends all required programming and continues to be an active participant in group and treatment processes.  Patient continues to complete homework assignments as directed in his treatment plan and is actively working towards his treatment plan goals.    Dimension 5 4 Patient reports about 14 previous CD treatment attemps in his lifetime.  Patient continues to gain insight into relapse factors, including his unmanaged emotions, such as anger.  Patient has also identified his use of passive communication as problematic for him and has begun to utilize assertive communicaiton skills he has " learned.  Patient completed assignment 'Relapse Prevention Plan' and is scheduled to transition to [facility] residential CD Treatment program following completion of [facility] programming as a continuum of care.  Patient attended relapse prevention workshop on 9/29/2018.  Patient appears to be at a high risk for continued substance use at this time.      Dimension 6 4 Patient has been accepted to [facility] residential CD Treatment program following completion of [facility] programming.  Patient participated in family priogramming with his significant other and is scheduled to participate in a couples session with [facility] staff family therapist.  Patient attended sober support groups this week.  Patient is currently unemployed and lacks a strong sober network at this time.      Guide to Risk Ratings for Suicidality:   IDEATION: Active thoughts of suicide? INTENT: Intent to follow on suicide? PLAN: Plan to follow through on suicide? Level of Risk:   IF Yes Yes Yes Patient = High Emergent   IF Yes Yes No Patient = High Urgent/Non-Emergent   IF Yes No No Patient = Moderate Non-Urgent   IF No No   No Patient = Low Risk   The patient's ADDITIONAL RISK FACTORS and lack of PROTECTIVE FACTORS may increase their overall suicide risk ratings.     Patient's/client's current risk rating:  Low Risk    Family Involvement:   Patient participated in family week with significan other the week of 9/24/2018 and will participate in a couples session on 10/4/2018.    Data:   offered feedback client did actively participate      Intervention:   Aftercare planning  Behavior modification  Counselor feedback  Education  Emotional management  Group feedback  Relapse prevention  Mental health education  Family week    Assessment:   Stages of Change Model  Preparation/Determination    Appears/Sounds:  Cooperative  Motivated  Engaged      Plan:  Focus on recovery environment  Monitor  emotional/physical health  Continue to work towards treatment plan goals.    KIRTI Merritt

## 2018-10-05 ENCOUNTER — HOSPITAL ENCOUNTER (OUTPATIENT)
Dept: BEHAVIORAL HEALTH | Facility: CLINIC | Age: 43
End: 2018-10-05
Attending: FAMILY MEDICINE
Payer: COMMERCIAL

## 2018-10-05 PROCEDURE — H2035 A/D TX PROGRAM, PER HOUR: HCPCS | Mod: HQ

## 2018-10-05 PROCEDURE — 10020000 ZZH LODGING PLUS FACILITY CHARGE ADULT

## 2018-10-05 PROCEDURE — H2035 A/D TX PROGRAM, PER HOUR: HCPCS

## 2018-10-06 ENCOUNTER — HOSPITAL ENCOUNTER (OUTPATIENT)
Dept: BEHAVIORAL HEALTH | Facility: CLINIC | Age: 43
End: 2018-10-06
Attending: FAMILY MEDICINE
Payer: COMMERCIAL

## 2018-10-06 PROCEDURE — H2035 A/D TX PROGRAM, PER HOUR: HCPCS

## 2018-10-06 PROCEDURE — 10020000 ZZH LODGING PLUS FACILITY CHARGE ADULT

## 2018-10-06 NOTE — PROGRESS NOTES
Nursing Discharge Planning Meeting    Writer completed discharge planning meeting with patient. Discharge is planned for Monday, 10/8/2018 to home with outpatient treatment during the week.    Discussed appropriate follow up care to manage tobacco cessation, GERD, and to obtain medication refills. Patient declined a copy of his current medications for reference as he is only taking one medication in addition to nicotine replacement medications. Eric did not have any questions at this time and he verbalized an understanding of his post-discharge follow up plan.    Eric has refills on all medications and will schedule an appointment with his PCP at [facility] as needed in the future.    Continue to support patient in discharge planning as needed to assure appropriate continuity of care.     Tobacco Cessation  Patient participated in the nicotine replacement therapy for tobacco cessation or reduction during their treatment programming: Yes    The patient was provided with community resources for follow-up to continue tobacco cessation support once in the community. Also the patient was encoruaged to discuss their tobacco cessation efforts with the primary care provider.

## 2018-10-07 ENCOUNTER — HOSPITAL ENCOUNTER (OUTPATIENT)
Dept: BEHAVIORAL HEALTH | Facility: CLINIC | Age: 43
End: 2018-10-07
Attending: FAMILY MEDICINE
Payer: COMMERCIAL

## 2018-10-07 PROCEDURE — 10020000 ZZH LODGING PLUS FACILITY CHARGE ADULT

## 2018-10-07 PROCEDURE — H2035 A/D TX PROGRAM, PER HOUR: HCPCS | Mod: HQ

## 2018-10-08 NOTE — ADDENDUM NOTE
Encounter addended by: Melissa Thomas on: 10/8/2018  9:40 AM<BR>     Actions taken: Sign clinical note

## 2018-10-08 NOTE — PROGRESS NOTES
"                     [facility] Discharge Summary/Instructions     Patient: Eric Escobar  MRN: [id number]   : 1975 Age: 43 year old Sex: male   -  Focus of Treatment / Discharge Recommendations    Personal Safety/ Management of Symptoms    * Follow your safety plan.  Report increased symptoms to your care team and /or go to the nearest Emergency Department or call 911.    * Call crisis lines as needed  *  [place] CRISIS TEXT LINE:  Text \"[place]\" to [phone]  This option is available     [facility] [phone]                [facility] [phone]  [facility] [phone]              Crisis Connection [phone]  [facility] [phone]              [facility] [place] [facility] [phone]  [facility] [phone]          National Suicide Prevention [phone]  [facility] [phone]            Suicide Prevention [phone]  [facility] [phone]    Abstinence/Relapse Prevention  * Take all medicines as directed.  Carry a current list of medicines with you.  * Use coping skills: Utilize assertive communication skills and seek support from sober network.  * Do not use illicit (street) drugs, controlled substances (narcotics) or alcohol.    Develop/Improve Independent Living/Socialization Skills: Ensure living environment is conducive to recovery efforts.    Community Resources/Supports and Discharge Planning:      *  Attend and complete Intensive Outpatient (IOP) CD Treatment programming at [facility].    - Arrive for first group session on Monday, 2018 @ 5:30 PM.    - Contact:  Diana Steward [phone]  *  Continue to meet with established mental health therapist in the community and follow recommendations.  *  Attend a minimum of three AA/NA/Sober support groups in the community.  *  Gain a sponsor and meet with him weekly.  *  Remain law abiding.  *  Continue to invest in building sober support network.  *  Continue to monitor and understanding " of relapse triggers and stressors through the use and development of healthy coping skills.    Client Signature:_______________________   Date / Time:___________  Staff Signature:________________________   Date / Time:___________

## 2018-10-11 ENCOUNTER — HOSPITAL ENCOUNTER (OUTPATIENT)
Dept: BEHAVIORAL HEALTH | Facility: CLINIC | Age: 43
End: 2018-10-11
Attending: SOCIAL WORKER
Payer: COMMERCIAL

## 2018-10-11 PROCEDURE — H2035 A/D TX PROGRAM, PER HOUR: HCPCS | Mod: HQ

## 2018-10-12 ENCOUNTER — HOSPITAL ENCOUNTER (OUTPATIENT)
Dept: BEHAVIORAL HEALTH | Facility: CLINIC | Age: 43
End: 2018-10-12
Attending: SOCIAL WORKER
Payer: COMMERCIAL

## 2018-10-12 PROCEDURE — H2035 A/D TX PROGRAM, PER HOUR: HCPCS | Mod: HQ

## 2018-10-13 NOTE — PROGRESS NOTES
"Eric Carbajaliburton  October 12, 2018  [id number]    [facility] Mental Health Process Group Note    Day and Date:  Friday, 10/12/18        Number of participants: 3      Length of Group: 3 hours     Goal of the Group: Learn current neuroscience of addiction to better understand addiction-as-a-disease and to reduce confusion, shame and guilt leading to increased open-mindedness and psychological flexibility needed to build resilience. Clients learn what is meant by  retrain the brain.        [place]: Group Topics and Activities      I.  D3 Intervention and Group Topic addressed: Neuroscience and the importance of treatment     II. Mindfulness and/or Motivational Component:  Review of mindfulness exercise; they were asked to review Diaz Nice's 18 minute talk on addiction and compassion for next week.     III. Additional Activity: Group learned the different roles of parts of the brain and how they are changed by addiction.  They learned how these changes manifest in behaviors and consequences and how to strengthen the frontal cortex to reduce the impact of cravings and using thoughts emanating from the midbrain.  Group compared different areas of addiction that society may condone and others that still have stigma attached to them.           IV. Review of Progress:   A. Client self-report:  This was Eric's first group with this therapist. He shared some of his use history with the group and said he had used \"crack and meth\" and \"partied for 20 years and the partying has gotten worse, consequences are not good.\" He said he wants to be accountable and trustworthy and \"I'm tired of just existing, I want to live.\"  He was given his journal instructions and agreed to do this each night. He is unfamiliar with mindfulness at this point but will learn this next week.  He rated his stress at 2-3 and said his stress is over rebuilding his basement.  He had a craving today when he saw that their new puppy had an accident in the " "house.  He uses \"self-talk\" when he has a craving like, \"Why would you do that when overall things are going so good?\"        B. Therapist Assessment:   Eric said he \"loved [facility]\" and learned a lot. He was open to the group process and asked relevant questions this evening.      V.   Reflection and evaluation of today s group experience:  Gave verbal feedback and filled out evaluation form. Client wrote the most important thing(s) learned today included \"That the addict alters the mid-brain area\" and \"Ways to recognize and cope with cravings.\"     VI. Assignments or activities to do for next week: Continue to do journal before bed, do at least one mindfulness exercise in the day time, address one self-care area a week, incorporate one stress reduction technique into daily schedule of structure and routine.     Therapeutic techniques in this session:  Motivational Therapy, CBT/DBT/ACT, Supportive, Behavioral Modification and Mindfulness      Additional Treatment Referrals/Follow-up Issues to Address: Not needed at this time.      Change in Treatment Plan: No change. This session completes one Treatment Plan intervention in D3.     Change in Diagnosis: No change in diagnosis indicated. Eric appears highly motivated to continue learning and applying skills to his sobriety.     "

## 2018-10-15 ENCOUNTER — HOSPITAL ENCOUNTER (OUTPATIENT)
Dept: BEHAVIORAL HEALTH | Facility: CLINIC | Age: 43
End: 2018-10-15
Attending: SOCIAL WORKER
Payer: COMMERCIAL

## 2018-10-15 ENCOUNTER — BEH TREATMENT PLAN (OUTPATIENT)
Dept: BEHAVIORAL HEALTH | Facility: CLINIC | Age: 43
End: 2018-10-15

## 2018-10-15 DIAGNOSIS — F19.90 SUBSTANCE USE DISORDER: ICD-10-CM

## 2018-10-15 PROCEDURE — H2035 A/D TX PROGRAM, PER HOUR: HCPCS | Mod: HQ

## 2018-10-15 PROCEDURE — H2035 A/D TX PROGRAM, PER HOUR: HCPCS

## 2018-10-15 NOTE — PROGRESS NOTES
[facility]  Adult Chemical Dependency Program  Treatment Plan Requirements    These services are provided by the facility for each patient/client according to the individual's treatment plan:    Individual and group counseling    Education    Transition services    Services to address any co-occurring mental illness    Service coordination    Initial Treatment Plan Goals:  1. Complete all the requirements of Program Orientation.  2. Maintain medication compliance throughout the program.  3. Complete requirements for workshop/skills groups based on identified issues on your problem list.  4. Complete the support group attendance feedback sheet weekly.  5. Gain family involvement in treatment process to address family issues from the problem list.  6. Attend and participate in all required groups per individual treatment plan.  7. Focus attention to individualized issues from the treatment plan.  8. Complete all requirements for UA's, alcohol screening tests and other testing.  9. Schedule a physical examination if recommended.    In addition to the above, complete all individual goals as specifically outlines on your treatment plan.    Criteria for discharge:  Patients/clients are discharged from the program following completion of the entire program including Phase I and II or acceptance of other post-treatment referrals such as correction house, or aftercare at other facilities.  Patients/clients may also be discharged for inappropriate behavior or chemical use.      Favorable Discharge - Patients/clients have completed agreed upon treatment goals, understand their diagnosis and appear motivated about the follow-up care.    Guarded Discharge - Patients/clients have demonstrated some understanding of their diagnosis and recovery process, and have completed some of their treatment goals.  This prognosis also includes patients/clients who have completed some treatment goals but have not made  commitment to community support or follow through with referrals.    Unfavorable Discharge - Patients/clients have not completed agreed upon treatment goals due to their own choice, have limited understanding of their diagnosis, and have shown minimal or inconsistent behavior conducive to recovery.  Those patients/clients discharged due to behavioral problems will also be unfavorable discharges.    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 Last use date was reported as 9/2/18 for crack cocaine.   Update 1/17/2019: New last use date of 1/5/19.         Goal:  Develop effective strategies to maintain sobriety.    Treatment Strategies:  Report to counselor and group any alcohol or drug use. Weekly, until approx. D/C date of   2/12/19   MF Client left tx AWOL        Biomedical Conditions and Complaints     DIMENSION 2  RISK FACTOR: 0             Date Assigned Source Area of Treatment Focus / Goal / Treatment Strategies Target  Date Initials Outcome Date Completed   10/15/2018 Self -  Deferred and Assessment -  Deferred  Area of Treatment Focus:  None.    Goal:  Disclose CD status to medical providers and follow up with medical interventions while in treatment program.     Treatment Strategies:  Take medications  "as prescribed and follow-up with medical interventions while in the program. Maintain healthy exercise and diet.  Weekly, until approx. D/C date of   2/12/19   MF Client left tx AWOL        Emotional/Behavioral/Cognitive Conditions and Complications     DIMENSION 3  RISK FACTOR: 1             Date Assigned Source Area of Treatment Focus / Goal / Treatment Strategies Target  Date Initials Outcome Date Completed     10/15/2018    Updated 1/17/2019  Self -  Referred and Assessment -  Referred/current  Area of Treatment Focus:   Client reports history of poor emotions management.     Goal:  Client identified a goal to learn how to identify emotions and express them in healthy ways, as well as practice \"recognizing what's underneath\".       Treatment Strategies:  1. Client will complete \"feelings\" assignment and share with group.  10/25/2018: Client presented assignment. Identified feelings of being unloved, defeated, rage and anger contributed to his use. Identified defense mechanisms of: silence, defiance, withdraw, projection, justification. Identified how these behaviors had harmed himself and those around him, and contributed to his substance use.   2. Client will identify the strongest emotion he experienced that day and coping skill(s) applied to manage that emotion.   3. Added 1/17/2019: Start EMDR therapy with Greta Elisha (Phone: [phone]) to address past traumas, address painful emotions and learn to better process painful emotions. .     Area of Treatment Focus:   Client reports lacking a \"sense of purpose\", and lacks understanding of his \"strengths vs weaknesses\".     Goal:  Client identified a goal to find a sense of purpose, and better recognize \"strengths vs weaknesses.\"      Treatment Strategies:  4. Client will complete \"personal values questionnaire\" Completed 11/8/18   5. Client will create 3 goals to better live within personal values as identified in above lesson.  1. " 10/25/18        2.   Weekly, until Phase 2 (approx. 11/16/18)    3.  1/25/19                              4.   11/8/18    5.  11/22/18 MF Effective                                  10/25/2018      10/15/2018 Self -  Referred and Assessment -  Referred  Area of Treatment Focus:   Client lacks understanding of addiction as a disease and how this disorder affects other aspects of mental and physical health.     Goal:  Learn how to apply self-care and psychotherapy to build a repertoire of daily habits that counteract PAWS, fatigue, depression and anxiety leading to increased resiliency and well-being.      Treatment Strategies:  5. Attend the following 3-hour groups:    > Neurobiology of addiction: Informs client of brain changes and reduces feelings of shame, instructs on steps to help heal.    > Learn and use Mindfulness to facilitate effective action in problem solving and promote health and well-being.     > Mental Health Part 1: Learn 3 core processes of Acceptance, Cognitive De-fusion, and Being Present.    > Mental Health Part 2: Learn 3 core processes of Self-as-Context, Values, and Committed Action.     > Learn and apply Self-Care in a variety of areas to improve mental and physical health, reduce PAWS, and increase feelings of self-empowerment, resiliency and well-being.    > Learn Stress Management techniques to reduce PAWS and stress-related symptoms, increase resiliency, and learn healthy routines to replace dysfunctional habits. 5.   11/16/18 RUBEN MIRANDA/HA  Effective                           Effective      Effective       Effective      [Excused]  Effective    Effective        Effective                             10/12/18       10/19/18      10/26/18       [11/02/18]    12/14/18 11/09/18 11/16/18           Readiness to Change     DIMENSION 4  RISK FACTOR: 1             Date Assigned Source Area of Treatment Focus / Goal /  "Treatment Strategies Target  Date Initials Outcome Date Completed   10/15/2018    Updated 1/17/2019  Self -  Current and Assessment -  Current  Area of Treatment Focus:  Client has a history of low self-esteem.     Goal:  Client identified a goal to lift his self-esteem.      Treatment Strategies:  1. Client will complete self-esteem transfusion schedule for 10 days. Report on progress, insights in group.  11/15/18: Client struggled with adherence to this assignment, but did complete it effectively. Client reported he began to have an easier time believing the positive statements about himself as the days went on. Client believes this may be in part due to the activity itself, and in part due to his continued sobriety.     Area of Treatment Focus:  Client has a history of relapse and self-sabotage.    Goal:  Increase insight into motivations and consequences for using, develop alternative methods to address these areas.    Treatment Strategies:  2. Complete \"Motives and Consequences\" packet. Share with group.  11/15/18: Client shared this assignment in group. Client appeared to gain increased insight into his motivations for using as well as the negatives of using.  1.   10/25/18                              2.  11/15/18 MF Effective                                   Effective 11/15/18                                  11/15/18        Relapse/Continues Use/Continues Problem Potential     DIMENSION 5  RISK FACTOR: 3                 Date Assigned Source Area of Treatment Focus / Goal / Treatment Strategies Target  Date Initials Outcome Date Completed   10/15/2018    Updated 1/17/2019  Self -  Current and Assessment -  Current  Area of Treatment Focus:  Client lacks insight into his relapse process along with early warning signs and triggers.      Goal:  Identify and address relapse triggers and cues.      Treatment Strategies:  1. Client will complete \"personal relapse prevention plan\", identifying risky situations and " "creating plans to address each area.    Area of Treatment Focus:  Client has a history of poor application of coping skills.    Goal:  Implement and/or improve application of coping skills to avoid relapse.    Treatment Strategies:  2. Client will identify and practice one new coping skill each week. Client will rate how helpful this coping skill was on a scale of 1-10 (1 being not at all helpful, 10 being the most helpful. Client will report progress in group and with counselor weekly.  3. Added 1/17/2019: Return to healthy recover routine of: daily readings, exercising 4x per week, meetings 2x weekly, daily personal hygiene, doing 2 house chores 3x per week.  1.   12/13/18                          2. End of Phase 2, approx.  12/6/18  MF Client left tx AWOL         Recovery Environment     DIMENSION 6  RISK FACTOR: 3                 Date Assigned Source Area of Treatment Focus / Goal / Treatment Strategies Target  Date Initials Outcome Date Completed   10/15/2018    Updated 1/17/2019  Self -  Current and Assessment -  Current  Area of Treatment Focus:  Client lacks sober leisure activities.         Goal:  Client identified a goal to improve sober hobbies and interests.    Treatment Strategies:  1. Client will complete assignment \"25 things I want to do in recovery\". Share with counselor.   2. Client will complete 2 activities on above list per month.   3. Client will complete Recovery Care Plan, share with group.     Added 1/17/2019:   Area of Treatment Focus:  Client lacks a supportive living environment/community.       Goal:  Build support in the community, strengthen relationships at home.     Treatment Strategies:  4. Client and spouse will make couples therapy appointment. Report progress in group weekly.   5. Client will attend minimum 2 meetings per week. Report progress in group weekly.   6. Patient will not work more than 40 hours per week. Report progress in group weekly.  1.   10/25/18      2.   Weekly, " until approx. D/C date of 2/12/19       3.   Last week of treatmentapprox date,  2/12/19    4-6.   Weekly, until discharge  MF Client left tx AWOL      Individual abuse prevention plan (required for [facility]) : specific actions, referral:   No additional protection measures required other than the Program Abuse Prevention Plan - No      All interventions that are designated as current will need to be completed in order to transition out of treatment with a favorable prognosis. The treatment plan is a flexible document and a work in progress. Interventions and goals may be added at any time to customize this plan to each individual's needs. Client may work with clinician to change interventions as long as they pertain to the goals stipulated in the plan and/or are clinically driven.  NELSON Ledesma, KIRTI  Acknowledgement of Current Treatment Plan - Initial Treatment Plan     INITIAL TREATMENT PLAN:     1. I have participated in creating my treatment plan with my therapist / counselor on _10/15/2018__.     I agree with the plan as it is written in the electronic health record.    Name Signature/Date   Client: Eric Escobar     Name of Therapist / Counselor Signature/Date   Counselor/Therapist:    NELSON Ledesma, KIRTI      2. I have completed and reviewed my Safety Plan with my counselor and signed this on _________. I have been given the hard copy of this plan.    Client signature/date:      _____________________________________________________________________________    3. Last Use Date: __________    Client signature/date:     _____________________________________________________________________________                      Acknowledgement of Current Treatment Plan - Additional Entries       ADDITIONAL GOALS AND INTERVENTIONS:    Signatures/dates required for any additional Problems, Goals, and/or Interventions added to treatment plan:  Change/Addition in Dimension _3, 5, 6_ on  date:_1/17/2019 _  Insert here:     See Dimensions 3,5 and 6    I have participated in creating this change and/or addition to my treatment plan copied above.   I have been given a copy of this signature page with change/addition to my treatment plan and I am in agreement with how it is written in the electronic record.       Patient signature:       ________________________________________________________________________      Counselor/Therapist signature:    ________________________________________________________________              Change in date of last use:       ________________________________________________________________        Patient signature/date (required for change in last use date):     ________________________________________________________________

## 2018-10-15 NOTE — PROGRESS NOTES
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 No signs or symptoms of intoxication or withdrawal reported or observed.     Dimension Two: Biomedical Condition and Complications    Ratin  (Consider the degree to which any physical disorder would interfere with treatment for substance abuse, and the client's ability to tolerate any related discomfort; determine the impact of continued chemical use on the unborn child if the client is pregnant) Client reports no biomedical concerns that would interfere with treatment. Client appears capable of autonomous healthcare.     Dimension Three: Emotional/Behavioral/Cognitive Conditions & Complications  Ratin  (Determine the degree to which any condition or complications are likely to interfere with treatment for substance abuse or with functioning in significant life areas and the likelihood of risk of harm to self or others) Client reported a historical diagnosis of bipolar disorder, but suspects he has depression. Client's past diagnostic may have been affected by client's polysubstance abuse. Client denied suicidal ideation or self-injurious behavior. Client may warrant a DA if mental health concerns continue beyond 6 weeks  of sobriety. Client reports he has a therapist in the community, but due to multiple no shows with this therapist, is now only able to schedule 1 session per month and will need to work his way back up to weekly. Client will meet with MARLA Estevez and his primary counselor as needed for individual sessions to address mental health and substance use concerns. Client reports some knowledge of alternative coping skills and understanding of the relationship between mental health and substance use, but lacks real-world experience applying these coping skills and is at a moderate vulnerability of continued substance use as a result of poor impulse control and poor emotions management.     Dimension Four: Treatment Acceptance/Resistance     Ratin  (Consider the amount of support and encouragement necessary to keep the client involved in treatment) Client reports internal motivation to be sober for himself and his family. Client completed the residential treatment program. Client did not follow through on initial recommendations to complete aftercare at [facility], but did follow through on the recommendation to attend intensive outpatient treatment at [facility]. Client has attended at least 14 past treatment stays and has a history of inconsistent treatment compliance. Client verbalizes a willingness to follow through on treatment recommendations and referrals at this time. Client appeared to participate well in group therapy sessions. Client appears to recognize the impact substance use has had on his life and his family. He appears to have some insight into the consequences of his use, but appears to lack insight into the motivations for his continued use despite consequences.     Dimension Five: Continued Use/Relaspe Prevention     Rating: 3  (Consider the degree to which the client's recognizes relapse issues and has the skills to prevent relapse of either substance use or mental health problems) Client  reports a last use date of 9/2/18. Client reports a long history of polysubstance abuse, including daily use of alcohol, marijuana, crack, and methamphetamine at various points in his life. At client's initial eval and presentation to [facility], client's primary drug of choice is crack cocaine.  Client has continued to use despite consequences in various areas of his life including occupational, social, and psychological functioning. Client appears to be at moderate to high risk of relapse at this time. Client appears to have theoretical knowledge of his relapse cycle and cues, but would benefit from practice identifying these areas in real-world situations. Client appears to have some knowledge of coping skills, but lacks experience applying them to various emotions and life situations.     Dimension Six: Recovery Environment     Rating: 3  (Consider the degree to which key areas of the client's life are supportive of or antagonistic to treatment participation and recovery) Client reports living with his girlfriend and children. Client reports this is a supportive environment, but reports it has in the past been antagonistic due to poor communication patterns and hurtful behaviors from both sides of the relationship. Client's unmanaged emotions triggered by issues in his significant relationship has caused use episodes in the pat. Client reports however he and his significant other are making efforts to improve communication patterns. Client reports an awareness of meetings available in his areas and past meeting attendance. Client reported a goal to establish care with a temporary sponsor. Client's family participated in family week at [facility]. Client is currently unemployed and is at risk of being triggered by too much unstructured time in his day or meaningful involvement in positive activities. Client appears to lack a sober support network at this time or involvement in sober activities/interests. Client would  benefit from guidance and support from [facility] to improve these areas.    I have reviewed the information on the assessment, psychosocial and medical history and checklist:        it is current

## 2018-10-16 ENCOUNTER — HOSPITAL ENCOUNTER (OUTPATIENT)
Dept: BEHAVIORAL HEALTH | Facility: CLINIC | Age: 43
End: 2018-10-16
Attending: SOCIAL WORKER
Payer: COMMERCIAL

## 2018-10-16 PROCEDURE — H2035 A/D TX PROGRAM, PER HOUR: HCPCS | Mod: HQ

## 2018-10-17 NOTE — PROGRESS NOTES
CD ADULT Progress Note     Treatment Plan Review completed on:  10/18/18     Attendance Dates: 10/15/18 10/16/18 10/18/18 10/19/18     Total # of P1 Group Sessions: 6  Total # of P2 Group Sessions: NA  Total # of P3 Group Sessions: NA  Total # of 1:1 Sessions: 1.      MONDAY TUESDAY WEDNESDAY THURSDAY FRIDAY SATURDAY SUNDAY Total   Group Therapy 2   2 3   7   Specialty Groups*  2       2   1:1 1       1   Family Program           [place]             Phase II             Absent           Total 3 2  2 3   10       *Specialty Groups include Mental Health Care, Assertiveness and Communication, Sobriety Maintenance Skills, Spiritual Care, Stress Management, Relapse Prevention, Family Systems.                    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 Use Disorder Moderate  304.20 (F14.20) Cocaine Use Disorder Severe  305.10 (F17.200)  Tobacco Use Disorder Moderate     1) Care Coordination Activities: Coordinated transition of care from LP to IOP  2) Medical, Mental Health and other appointments the client attended: None reported  3) Medication issues: None reported  4) Physical and mental health problems: None reported  5) Review and evaluation of the individual abuse prevention plan: NA     [facility] Risk Ratings and Data       DIMENSION 1: Acute Intoxication/Withdrawal  The client's ability to cope with withdrawal symptoms and current state of intoxication       Acute Intoxication/Withdrawal - Current Risk Factor:  0    Reporting sober  "date of 9/3/18    Goals: Develop effective strategies to maintain sobriety.     Data: Client denied nor demonstrated any signs/symptomology of intoxication and/or withdrawal. Client confirmed his last use date was on 9/2/18.     DIMENSION 2:  Biomedical Conditions and Complaints  The degree to which any physical disorder would interfere with treatment for substance abuse and the client's ability to tolerate any related discomfort     Biomedical Conditions and Complaints - Current Risk Factor:  0    Goals: Disclose CD status to medical providers and follow up with medical interventions while in EOP.     Data: Client reports attending the following doctor s appointments over the past week: None. Client reports the following changes to his physical health over the past week: \"None\". Client is capable of autonomous healthcare.     Current Outpatient Prescriptions   Medication     nicotine (NICODERM CQ) 21 MG/24HR 24 hr patch     nicotine polacrilex (COMMIT) 2 MG lozenge     nicotine polacrilex (NICORETTE) 2 MG gum     omeprazole (PRILOSEC) 20 MG CR capsule     No current facility-administered medications for this encounter.      Facility-Administered Medications Ordered in Other Encounters   Medication     Self Administer Medications: Behavioral Services        DIMENSION 3:  Emotional/Behavioral/Cognitive Conditions and Complications  The degree to which any condition or complications are likely to interfere with treatment for substance abuse or with function in significant life areas and the likelihood of risk of harm to self or others.     Emotional/Behavioral - Current Risk Factor:  1    DSM-5 Diagnoses:  Unclear- hx of bipolar diagnosis, but reports he suspects he has depression. Mental health diagnoses may have been confounded by substance use. DA may be warranted after client has sufficient sobriety.     Suicide Assessment:   Risk Status    Ideation - Active thoughts of suicide Intent to follow through on suicide " "Plan for completing suicide    Yes No Yes No Yes No   Emergent  x  x  x   Urgent / Non-Emergent  x  x  x   Non- Urgent  x  x  x   No Current/Active Risk  x  x  x        Goals: Client identified a goal to learn how to identify emotions and express them in healthy ways, as well as practice \"recognizing what's underneath\". Client identified a goal to find a sense of purpose, and better recognize \"strengths vs weaknesses.\" Learn how to apply self-care and psychotherapy to build a repertoire of daily habits that counteract PAWS, fatigue, depression, anxiety and increase resiliency and well-being.      Data: Client denied suicidal ideation or self-injurious behavior. Client rated the following MH symptoms on a scale of 1-10 (0=did not endorse): sleeping more than usual- 0, fatigue- 3, difficulty concentrating- 0, restlessness- 0, mood swings- 0, irritability- 0, lack of interest or pleasure in activities- 0, hopelessness- 0, muscle tension- 3, shakiness- 0, sadness- 0, worry/rumination- 0, increased energy- 0, grandiosity- 0, sleeping less than usual- 2, appetite changes- 0, intrusive/distressing memories- 0, difficulty falling or staying asleep- 3.  Client reports that he coped with the aforementioned symptomology by \"used and new mask for CPAP.\" Client reported he planned to further practice the coping skill of \"meditation\".Client reported a mental health goal this week of: \"not sure\".      DIMENSION 4:  Readiness to Change  Consider the amount of support and encouragement necessary to keep the client involved in treatment.     Readiness to Change - Current Risk Factor:  1    Goals: Client identified a goal to lift his self-esteem.    Data: Client reported he moved his life forward this week by: \"set boundaries at home and I also have been taking \".       DIMENSION 5:  Relapse/Continued Use/Continued Problem Potential  Consider the degree to which the client recognizes relapse issues and has the skills to prevent " "relapse of either substance use or mental health problems.     Relapse/Continued Use/Continued Problem Potential - Current Risk Factor:  3    Goals: Increase insight into motivations and consequences for using, develop alternative methods to address these areas. Identify and address relapse triggers and cues.    Data: See DIAP below.      DIMENSION 6:  Recovery Environment  Consider the degree to which key areas of the client's life are supportive of or antagonistic to treatment participation and recovery.     Recovery Environment - Current Risk Factor:  3    Goals: Implement and/or improve application of coping skills to avoid relapse. Client identified a goal to improve sober hobbies and interests.    Family week dates: NA    Support group meetings attended this week: 0  .    Do you currently have a sponsor: No- wants to obtain a temporary sponsor  Did you have contact with your sponsor this week? NA    Data: Client rated their living Situation on a scale of 1-10 (1=completely antagonistic 10=totally supportive): 7. Client reports that this is due to: \"I tend to receive criticism for not following routine to a T\". Client reports during the past week that he built their sober support network by \"attended meetings and outpatient\". Client reported a plan to build their sober support network this week by \"attend more meetings\".    Employment: Client reports he is unemployed.  Volunteerism: No.         Goals: Increase insight into motivations and consequences for using, develop alternative methods to address these areas. Identify and address relapse triggers and cues.    Data: Client rated his cravings this week on a scale of 1-10 (1=none, 10= \"I used\"): 3. Client identified the following triggers that contributed to his cravings: \"being accused of something without given a chance to comment\". Client reported a goal to practice the following coping skill further: \"playing tape or DVD through\". Client reported he " "typically feels anxious and uncomfortable when he is bored/feels stuck in a routine. Client reported on 10/16/18 that when he got up in the morning to do his routine, he found himself having cravings, and believed it was due to feeling bored.     Intervention: Client was given group time to process his week and cravings. Counselor utilized cognitive behavioral therapy, motivational interviewing techniques. Counselor utilized validation, thought re-framing, self-esteem building and strengths-building techniques. Client participated in a skills group on boredom/chaos wherein he learned the dangers of living in the extremes of feeling bored/unstimulated and running from crisis to crisis in a chaotic environment. Client learned the importance of seeking balance between the two extremes- challenging self and exploring new interests/taking healthy risks, while also maintaining an overall routine and time for self-reflection/ meditation/ mindfulness.     Assessment: Client appears to be in the Stages of Change Model  Preparation/Determination within the stages of change model. Client appeared to understand the risks of living in either extreme of boredom or chaos. Client appeared to have insight into the discomfort he feels with living \"normally\" vs living in crisis/using. Client would benefit from further coaching/guidance to use coping skills and challenge thought patterns to manage urges to use and create chaos.     Plan: -Continue client in Phase 1    Diana Steward, NELSON MARTINEZ      "

## 2018-10-18 ENCOUNTER — HOSPITAL ENCOUNTER (OUTPATIENT)
Dept: BEHAVIORAL HEALTH | Facility: CLINIC | Age: 43
End: 2018-10-18
Attending: SOCIAL WORKER
Payer: COMMERCIAL

## 2018-10-18 PROCEDURE — H2035 A/D TX PROGRAM, PER HOUR: HCPCS | Mod: HQ

## 2018-10-18 PROCEDURE — H2035 A/D TX PROGRAM, PER HOUR: HCPCS

## 2018-10-18 NOTE — PROGRESS NOTES
INDIVIDUAL SESSION SUMMARY    D) Met with client on 10/18/18 from 4:30-5:25. Client spoke about experiencing a reduction in negative affect at home and attributed it to changes he and his significant other are working on Client reported that when they are having a difficult conversation they still refer to the communication flower from family week. Client spoke about how he has created rituals and a routine for himself at home by writing down his schedule the night before, and using his daytime hours to alternate between reading, exercising and chores. Client reported that he feels good being more responsible at home and that he appreciates his s.o. allowing him to manage his own schedule. Client spoke about the stressors his s.o. has managed financially with him not working and how he will give himself a couple of weeks before he's back to work. Client spoke of having three leads on work. Client stated that he will know it's time to return to work when he find his current routine to be boring. Client explained that his son, son's GF, and a daughter are also living with him and his s.o. and that the plan is to remodel the basement so everyone has an equal amount of space. Client reported that he's having difficulty staying asleep and that he made an appointment to get different C-pap equipment. Client spoke about how he and his s.o. are using the weekends for couple time and that he's asker her to schedule things that she's like to do because so much of his work week is recovery related activities. Client stated that using the cab rides available through [facility] for all his appointments has significant helped their relationship. Client and therapist discussed ways he could incorporate the Daily stress Journal he received from the counselor, Anastasia Moseley, into his daily routine. Client reported to have a session scheduled with his previous therapist, Greta Muller with [facility] but could not remember the  date.     I) Individual session with client. Provided client with verbal interventions including: validation, nurturing, compassion and support. Assigned homework on doing his daily stress journal and asked client to track progress in between sessions.    A) Client appears to be using new tools to improve communication and reduce negative affect at home. Client appears to be rebuilding trust with himself and his significant other. Client appears to be using new skills including reading, meetings and exercise for emotional regulation and stress management. Client appears to lack a sober support network outside his family.     P) Next session is scheduled for 10/24/18.   MARLA Tamayo  10/18/2018

## 2018-10-19 ENCOUNTER — HOSPITAL ENCOUNTER (OUTPATIENT)
Dept: BEHAVIORAL HEALTH | Facility: CLINIC | Age: 43
End: 2018-10-19
Attending: SOCIAL WORKER
Payer: COMMERCIAL

## 2018-10-19 PROCEDURE — H2035 A/D TX PROGRAM, PER HOUR: HCPCS | Mod: HQ

## 2018-10-20 NOTE — PROGRESS NOTES
"[facility]  October 19, 2018  [id number]    [facility] Mental Health Process Group Note      Day and Date: Friday, 10/19/18      Number of participants:  5      Length of Group: 3 hours      Goal of the Group: Learn mindfulness exercises to help clients focus on the present and increase their awareness of thoughts and emotions.  This process helps clients detach from unhelpful thinking patterns and be less affected by negative emotions. This practice has been shown to decrease reactivity and help facilitate effective action to work on changes in the present and thereby create a brighter, healthier future. This practice has also been shown to increase resiliency if practiced regularly.       [place]: Group Topics and Activities      I.  D3 Intervention and Group Topic addressed: Mindfulness to facilitate effective action     II. Mindfulness and/or Motivational Component: Mindful Eating, Awareness of Sounds Meditation, Sitting Meditation      III. Additional Activity:  Mindful Eating, [place] exercise       IV. Review of Progress:   A. Client self-report:  Eric reported that \"Overall my week was good.\"  He is having difficulty with the \"Medical transportation\" and they have cancelled his ride at times and also picked him up hours early.  This has been a trigger for him. He is getting ready for his daughter to deliver her baby, a boy.  He spoke about attending a \"black tie\" wedding in [place] coming up and what his plan B will be if the \"partying\" starts to trigger him.  He did his nightly check-in journal \"only the first night, then I forgot\" but will continue this from now on.  He learned more about mindfulness today. He enjoyed the mindful eating and stated, \"Cande never enjoyed a strawberry or pineapple like that.\" Someone told him once that he chewed each piece of food 32 times and he filled up on much less food.       B. Therapist Assessment:   Eric participates very well in group and is open to the " "group process. He uses humor when describing things.  His [place] exercise centered on \"Going back to work.\"  The comments  before the mindfulness included \"I need to find it soon\" and \"Where do I even look, when will I be able to start? What's the work environment going to be like, will I get along with my co-workers? I'll have to change my routine.\"  The comments  after were \"Yes, I will find a job\" and \"The job should be close until I find transportation\" and \"Don't worry about the envirionment, that's not a priority right now\" and \"I may have to change my routine, but I'm already getting bored with it anyway.\"     V.   Reflection and evaluation of today s group experience:  Gave verbal feedback and filled out evaluation form. Client wrote that the most important thing learned was \"Mindfulness keeps you present\" and he was surprised to learn \"that taking time chewing food and recognizing everything about the food brought on a blissful feeling.\"       VI. Assignments or activities to do for next week: Mindfulness in the day and journal before bed. Continue to monitor stress daily and use skills to manage.      Therapeutic techniques in this session:  Motivational Therapy, CBT/DBT/ACT, Supportive, Behavioral Modification and Mindfulness     Additional Treatment Referrals/Follow-up Issues to Address: Not indicated at this time      Change in Treatment Plan: No changes are indicated at this time. This group does complete one Treatment Plan intervention under D3.       Change in Diagnosis: No Changes.   Eric is insightful and offers positive ways to look at challenges within the group. He uses humor is appropriate ways to make his point.         "

## 2018-10-22 ENCOUNTER — HOSPITAL ENCOUNTER (OUTPATIENT)
Dept: BEHAVIORAL HEALTH | Facility: CLINIC | Age: 43
End: 2018-10-22
Attending: SOCIAL WORKER
Payer: COMMERCIAL

## 2018-10-22 PROCEDURE — H2035 A/D TX PROGRAM, PER HOUR: HCPCS | Mod: HQ

## 2018-10-23 ENCOUNTER — HOSPITAL ENCOUNTER (OUTPATIENT)
Dept: BEHAVIORAL HEALTH | Facility: CLINIC | Age: 43
End: 2018-10-23
Attending: SOCIAL WORKER
Payer: COMMERCIAL

## 2018-10-23 PROCEDURE — H2035 A/D TX PROGRAM, PER HOUR: HCPCS | Mod: HQ

## 2018-10-23 NOTE — PROGRESS NOTES
CD ADULT Progress Note     Treatment Plan Review completed on:  10/25/18     Attendance Dates: 10/22/18 10/23/2018 10/25/18 10/26/18     Total # of P1 Group Sessions: 10  Total # of P2 Group Sessions: NA  Total # of P3 Group Sessions: NA  Total # of 1:1 Sessions: 2.      MONDAY TUESDAY WEDNESDAY THURSDAY FRIDAY SATURDAY SUNDAY Total   Group Therapy 2   2 3   7   Specialty Groups*  2       2   1:1   1 (Melissa)     1   Family Program           [facility]             Phase II             Absent           Total 2 2 1 2 3   10       *Specialty Groups include Mental Health Care, Assertiveness and Communication, Sobriety Maintenance Skills, Spiritual Care, Stress Management, Relapse Prevention, Family Systems.                    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   2) Medical, Mental Health and other appointments the client attended: Met with MARLA Estevez  3) Medication issues: None reported  4) Physical and mental health problems: None reported  5) Review and evaluation of the individual abuse prevention plan: NA     ASA Risk Ratings and Data       DIMENSION 1: Acute Intoxication/Withdrawal  The client's ability to cope with withdrawal symptoms and current state of intoxication       Acute  "Intoxication/Withdrawal - Current Risk Factor:  0    Reporting sober date of 9/3/18    Goals: Develop effective strategies to maintain sobriety.     Data: Client denied nor demonstrated any signs/symptomology of intoxication and/or withdrawal. Client confirmed his last use date was on 9/2/18.     DIMENSION 2:  Biomedical Conditions and Complaints  The degree to which any physical disorder would interfere with treatment for substance abuse and the client's ability to tolerate any related discomfort     Biomedical Conditions and Complaints - Current Risk Factor:  0    Goals: Disclose CD status to medical providers and follow up with medical interventions while in EOP.     Data: Client reports attending the following doctor s appointments over the past week: None. Client reports the following changes to his physical health over the past week: \"None\". Client is capable of autonomous healthcare.     Current Outpatient Prescriptions   Medication     nicotine (NICODERM CQ) 21 MG/24HR 24 hr patch     nicotine polacrilex (COMMIT) 2 MG lozenge     nicotine polacrilex (NICORETTE) 2 MG gum     omeprazole (PRILOSEC) 20 MG CR capsule     No current facility-administered medications for this encounter.      Facility-Administered Medications Ordered in Other Encounters   Medication     Self Administer Medications: Behavioral Services        DIMENSION 3:  Emotional/Behavioral/Cognitive Conditions and Complications  The degree to which any condition or complications are likely to interfere with treatment for substance abuse or with function in significant life areas and the likelihood of risk of harm to self or others.     Emotional/Behavioral - Current Risk Factor:  1    DSM-5 Diagnoses:  Unclear- hx of bipolar diagnosis, but reports he suspects he has depression. Mental health diagnoses may have been confounded by substance use. DA may be warranted after client has sufficient sobriety.     Suicide Assessment:   Risk Status    " "Ideation - Active thoughts of suicide Intent to follow through on suicide Plan for completing suicide    Yes No Yes No Yes No   Emergent  x  x  x   Urgent / Non-Emergent  x  x  x   Non- Urgent  x  x  x   No Current/Active Risk  x  x  x        Goals: Client identified a goal to learn how to identify emotions and express them in healthy ways, as well as practice \"recognizing what's underneath\". Client identified a goal to find a sense of purpose, and better recognize \"strengths vs weaknesses.\" Learn how to apply self-care and psychotherapy to build a repertoire of daily habits that counteract PAWS, fatigue, depression, anxiety and increase resiliency and well-being.      Data: Client denied suicidal ideation or self-injurious behavior. Client rated the following MH symptoms on a scale of 1-10 (0=did not endorse): sleeping more than usual- 0, fatigue- 3, difficulty concentrating- 0, restlessness- 2, mood swings- 1, irritability- 2, lack of interest or pleasure in activities- 0, hopelessness- 0, muscle tension- 3, shakiness- 0, sadness- 0, worry/rumination- 0, increased energy- 0, grandiosity- 0, sleeping less than usual- 4, appetite changes- 0, intrusive/distressing memories- 0, difficulty falling or staying asleep- 5.  Client reports that he coped with the aforementioned symptomology by \"spiritual reading.\" Client reported he planned to further practice the coping skill of \"breathing and meditation\".Client reported a mental health goal this week of: \"sleep, good sleep\".    10/25/2018: Client presented assignment. Identified feelings of being unloved, defeated, rage and anger contributed to his use. Identified defense mechanisms of: silence, defiance, withdraw, projection, justification. Identified how these behaviors had harmed himself and those around him, and contributed to his substance use.     DIMENSION 4:  Readiness to Change  Consider the amount of support and encouragement necessary to keep the client involved " "in treatment.     Readiness to Change - Current Risk Factor:  1    Goals: Client identified a goal to lift his self-esteem.    Data: See DIAP below.      DIMENSION 5:  Relapse/Continued Use/Continued Problem Potential  Consider the degree to which the client recognizes relapse issues and has the skills to prevent relapse of either substance use or mental health problems.     Relapse/Continued Use/Continued Problem Potential - Current Risk Factor:  3    Goals: Increase insight into motivations and consequences for using, develop alternative methods to address these areas. Identify and address relapse triggers and cues.    Data: Client rated his cravings this week on a scale of 1-10 (1=none, 10= \"I used\"): 1. Client reported no triggers this week.     DIMENSION 6:  Recovery Environment  Consider the degree to which key areas of the client's life are supportive of or antagonistic to treatment participation and recovery.     Recovery Environment - Current Risk Factor:  3    Goals: Implement and/or improve application of coping skills to avoid relapse. Client identified a goal to improve sober hobbies and interests.    Family week dates: NA    Support group meetings attended this week: 0  .    Do you currently have a sponsor: No- wants to obtain a temporary sponsor  Did you have contact with your sponsor this week? NA    Data:  Client rated their living Situation on a scale of 1-10 (1=completely antagonistic 10=totally supportive): 9. Client reports that this is due to: \"no attacking or judging in conversations\". Client reports during the past week that he built their sober support network by \"meetings, reaching out to others\". Client reported a plan to build their sober support network this week by \"passion planner, meetings and [employer]\".     Employment: Client reports he is unemployed.  Volunteerism: No.         Goals: Client identified a goal to lift his self-esteem.    Data: Client reported he moved his life forward " "this week by: \"cut the cord from my daughter\". Client reported his girlfriend is pregnant, and had saved $5000 for maternity leave. Client reported he  [place] over the weekend his daughter had spent all except $500 of the money with her boyfriend on substances. Client reported his daughter denies using the substances, but that the boyfriend had been using them and told him he had \"told her to spend it\". Client reported he was upset watching his daughter make these choices, but is also trying to keep boundaries with his daughter so that she doesn't threaten his recovery. Client reported he initially reacted in anger at his daughter and her boyfriend, but realized this was unhelpful to the situation. Client reported the emotions model applied to his situation, wherein his emotions caused him to have action urges, but he used coping skills to stop himself from following through on these actions. Client presented his feelings assignment (see Dim III).     Intervention: Client was given group time to process his struggles with his daughter's choices. Counselor reinforced client's boundary setting. Counselor validated client's challenge with boundary setting, due to concern for the welfare of the grandchild. Counselor utilized cognitive behavioral therapy, motivational interviewing techniques. Counselor utilized validation, thought re-framing, self-esteem building and strengths-building techniques. Client participated in a skills group on the emotions model. Client learned the emotions model steps: event, interpretation, emotion, action urge, action, and result. Client learned how our interpretations can be influenced by cognitive distortions and learned the DBT skill of \"check the facts\". Client heard feedback on his feelings assignment, ways his group members related, and good insights client had made in his assignment.     Assessment: Client appears to be in the Stages of Change Model  Preparation/Determination within " the stages of change model. Client appeared to understand the concept of the emotions model. Client appeared to understand the importance of checking perception before acting. Client appeared to identify emotions that contribute to his addictive behaviors and appeared to honestly assess his defense mechanisms used in addiction. Client appeared willing to explore how these defense mechanisms had affected those around him.     Plan: -Continue client in Phase 1  -Follow up with client regarding temporary sponsor   -Client will be excused 11/1 and 11/2 for a wedding.     NELSON Ledesma [facility]

## 2018-10-24 ENCOUNTER — HOSPITAL ENCOUNTER (OUTPATIENT)
Dept: BEHAVIORAL HEALTH | Facility: CLINIC | Age: 43
End: 2018-10-24
Attending: SOCIAL WORKER
Payer: COMMERCIAL

## 2018-10-24 PROCEDURE — H2035 A/D TX PROGRAM, PER HOUR: HCPCS

## 2018-10-24 NOTE — PROGRESS NOTES
"INDIVIDUAL SESSION SUMMARY    D) Met with client on 10/24/18 from 4:30-5:30. Client spoke about enjoying the mindfulness exercises from last Friday's group with Anastasia Moseley. Client spoke to how he has been keeping up with his daily routine and allowing himself the flexibility to to his routine in a different order, but still completing all the of the same activities. Client and therapist discussed the importance of daily rituals including washing his face and brushing his teeth. Client reported that he was feeling \"anxious\" about his oldest daughter going on a trip to [place] alone. Client and therapist role-played how to express his concern in e healthy way. Client spoke about concern he has for his youngest daughter who is having a baby in Dec/[place]; that she had lost the money she had saved for maternity leave. Client and therapist discussed how he can show support and not jump in to fix her problems. Client reported that he and his GF are [facility] to attend a wedding. Client spoke to the conversations that he and his GF have already been having about their exit strategy is being around intoxicated people is too much for the client. Client spoke about how he's trying to encourage healthier eating and more exercise for both himself and his GF by making different food choices and encouraging them to be active together. Client spoke about the \"positive energy\" at home. For self-care client reported that he is also attending 12-step meetings.     I) Individual session with client. Provided client with verbal interventions including: validation, nurturing, compassion and support. Discussed the benefits of: healthy boundaries with his adult children and GF; sharing his feelings and allowing them to make their own decisions.     A) Client appears to be using new tools to improve communication and reduce negative affect at home. Client appears to be rebuilding trust with himself, his significant other " and his children. Client appears to be using new skills including reading, meetings and exercise for emotional regulation and stress management. Client appears to be connecting with the peers in his aftercare group.      P) Next session is scheduled for 10/31/18.   MARLA Tamayo  10/24/2018

## 2018-10-25 ENCOUNTER — HOSPITAL ENCOUNTER (OUTPATIENT)
Dept: BEHAVIORAL HEALTH | Facility: CLINIC | Age: 43
End: 2018-10-25
Attending: SOCIAL WORKER
Payer: COMMERCIAL

## 2018-10-25 PROCEDURE — H2035 A/D TX PROGRAM, PER HOUR: HCPCS | Mod: HQ

## 2018-10-26 ENCOUNTER — HOSPITAL ENCOUNTER (OUTPATIENT)
Dept: BEHAVIORAL HEALTH | Facility: CLINIC | Age: 43
End: 2018-10-26
Attending: SOCIAL WORKER
Payer: COMMERCIAL

## 2018-10-26 PROCEDURE — H2035 A/D TX PROGRAM, PER HOUR: HCPCS | Mod: HQ

## 2018-10-27 NOTE — PROGRESS NOTES
"Eric VALENCIA [facility]  October 26, 2018  [id number]    [facility] Mental Health Process Group Note      Mental Health Part 1:  Acceptance, Cognitive Defusion, Being Present    Day and Date:  Friday, 10/26/18    Number of participants: 6     Length of Group:  3 hours    Goal of the Group: Clients learn key concepts of traditional CBT (cognitive distortions, conditioning, automatic thoughts, reframing) and then build on these by learning three core concepts of third wave therapies (ACT, DBT, MB): Acceptance, Cognitive Defusion and Being Present. The objective is to increase psychological flexibility and choose behaviors that serve the clients  personal values.      Rating scales are 1-10, with 1 being low and 10 being high or most severe.     [place]: Group Topics and Activities     I.  D3 Intervention and Group Topic addressed: Part 1:  3 Core processes to increase psychological flexibility and increase resiliency for mental and physical health.     II. Mindfulness and/or Motivational Component: Getting Comfortable with Moods, Exploring Affirmations to use this week, Liquid Mood meditation, Awareness of Sounds     III. Additional Activity:  Three Simple Steps to Acceptance; Using Affirmations to increase Self-Acceptance; Cognitive Defusion exercises     IV. Review of Progress:   A. Client's self-report: Eric reported having a good week and saying his daughter left for [place] for a music festival all weekend. She has been calling them and letting them know she is safe. He has been doing his journal and mindfulness and his stress is at a 2 and he has had no cravings.     B.  Therapist's Assessment: Eric participates very well and his liquid mood was red/yellow and his favorite affirmation today was \"Self-acceptance\" and a concern this topic brought up was \"Its OK to accept the thoughts or bad ones, it's about not acting on or letting them infiltrate my character.\"      V.   Reflection and evaluation of today s " group experience:  Gave verbal feedback during group and completed the evaluation. Comments included: The most important thing learned today was     VI. Assignments or activities to do for next week: Complete the journal and practice mindfulness daily. Try to use cognitive defusion throughout the week on negative thoughts and emotions.    Therapeutic techniques in this session:  Motivational Therapy, CBT/DBT/ACT, Supportive and Mindfulness    Additional Treatment Referrals/Follow-up Issues to Address: Not indicated at this time.    Change in Treatment Plan: Not indicated at this time. This group completes one of the Tx Plan interventions under D3.      Change in Diagnosis: No changes at this time.  Eric always adds unique perspective and humor to the group discussions.

## 2018-10-27 NOTE — ADDENDUM NOTE
Encounter addended by: Anastasia Moseley, [facility] on: 10/26/2018 10:34 PM<BR>     Actions taken: Flowsheet accepted

## 2018-10-29 ENCOUNTER — HOSPITAL ENCOUNTER (OUTPATIENT)
Dept: BEHAVIORAL HEALTH | Facility: CLINIC | Age: 43
End: 2018-10-29
Attending: SOCIAL WORKER
Payer: COMMERCIAL

## 2018-10-29 PROCEDURE — H2035 A/D TX PROGRAM, PER HOUR: HCPCS | Mod: HQ

## 2018-10-30 NOTE — PROGRESS NOTES
CD ADULT Progress Note     Treatment Plan Review completed on:  11/31/18/18     Attendance Dates: 10/29 Client is attending a wedding in [place]    Total # of P1 Group Sessions: 11  Total # of P2 Group Sessions: NA  Total # of P3 Group Sessions: NA  Total # of 1:1 Sessions: 2.      MONDAY TUESDAY WEDNESDAY THURSDAY FRIDAY SATURDAY SUNDAY Total   Group Therapy 2  Unexcued Excused Excused   2   Specialty Groups*            1:1           Family Program           [place]             Phase II             Absent           Total 2       2     *Specialty Groups include Mental Health Care, Assertiveness and Communication, Sobriety Maintenance Skills, Spiritual Care, Stress Management, Relapse Prevention, Family Systems.                    Learning Style:  Hands on  Verbal  Reading    Staff member contributing: Astrid Cotter MA, LADC, LADC     Received supervision:  No    Client: contributed to goals and plan    Did Client receive a copy of treatment plan/revised plan: Yes, signed 10/15/18     Changes to Treatment Plan: No    Client agrees with plan/revised plan: Yes- signed 10/15/18    Any changes in Vulnerable Adult Status:  No    Substance Use Disorders:   305.00 (F10.10) Alcohol Use Disorder Mild  305.20 (F12.10) Cannabis Use Disorder Mild  304.40 (F15.20) Amphetamine Use Disorder Moderate  304.20 (F14.20) Cocaine Use Disorder Severe  305.10 (F17.200)  Tobacco Use Disorder Moderate     1) Care Coordination Activities: Coordinated establishment of temporary sponsor through "[employer]ni program.   2) Medical, Mental Health and other appointments the client attended: Met with MARLA Estevez  3) Medication issues: None reported  4) Physical and mental health problems: None reported  5) Review and evaluation of the individual abuse prevention plan: NA     ASA Risk Ratings and Data       DIMENSION 1: Acute Intoxication/Withdrawal  The client's ability to cope with withdrawal symptoms and current state of intoxication  "      Acute Intoxication/Withdrawal - Current Risk Factor:  0    Reporting sober date of 9/5/18    Goals: Develop effective strategies to maintain sobriety.     Data: Client denied nor demonstrated any signs/symptomology of intoxication and/or withdrawal. Client confirmed his last use date was on 9/5/18.     DIMENSION 2:  Biomedical Conditions and Complaints  The degree to which any physical disorder would interfere with treatment for substance abuse and the client's ability to tolerate any related discomfort     Biomedical Conditions and Complaints - Current Risk Factor:  0    Goals: Disclose CD status to medical providers and follow up with medical interventions while in EOP.     Data: Client rated his overall health on a scale of 1-10 (1=poor, 10=excellent): 9. Client reports doing the following for self-care during the past week; \"regular eating and exercising schedule.\" Client reports the following changes to his physical health over the past week; \"Cpap- mmachine is not working and therefore affecting his sleeplessness, fatigue\". Client reports the following upcoming doctor s appointments: November 16th. Client has access to his medical provider if needed.    Current Outpatient Prescriptions   Medication     nicotine (NICODERM CQ) 21 MG/24HR 24 hr patch     nicotine polacrilex (COMMIT) 2 MG lozenge     nicotine polacrilex (NICORETTE) 2 MG gum     omeprazole (PRILOSEC) 20 MG CR capsule     No current facility-administered medications for this encounter.      Facility-Administered Medications Ordered in Other Encounters   Medication     Self Administer Medications: Behavioral Services        DIMENSION 3:  Emotional/Behavioral/Cognitive Conditions and Complications  The degree to which any condition or complications are likely to interfere with treatment for substance abuse or with function in significant life areas and the likelihood of risk of harm to self or others.     Emotional/Behavioral - Current Risk " "Factor:  1    DSM-5 Diagnoses:  Unclear- hx of bipolar diagnosis, but reports he suspects he has depression. Mental health diagnoses may have been confounded by substance use. DA may be warranted after client has sufficient sobriety.     Suicide Assessment:   Risk Status    Ideation - Active thoughts of suicide Intent to follow through on suicide Plan for completing suicide    Yes No Yes No Yes No   Emergent  x  x  x   Urgent / Non-Emergent  x  x  x   Non- Urgent  x  x  x   No Current/Active Risk  x  x  x        Goals: Client identified a goal to learn how to identify emotions and express them in healthy ways, as well as practice \"recognizing what's underneath\". Client identified a goal to find a sense of purpose, and better recognize \"strengths vs weaknesses.\" Learn how to apply self-care and psychotherapy to build a repertoire of daily habits that counteract PAWS, fatigue, depression, anxiety and increase resiliency and well-being.      Data: Client reports feeling the following two emotions today; \"calm and peaceful.\" Client rated the following MH symptoms on a scale of 1-10 (0=did not endorse): sleeplessness- 8, fatigue- 6, difficulty concentrating- 0, mood swings- 0, irritability- 1, sadness- 0, excessive sleep- 0, racing thoughts- 0, hopelessness- 0.   Client reports that he coped with the aforementioned symptomology by \"making an appointment to address his Cpap machine.  Client denies experiencing any symptoms not listed above this past week. Client idenified 2 life stressors to be \"not working out and not spiritually studying.\"  Client indicated that he \"does okay but wants to apply more time towards his stressors\" to manage theses stressors.  Client denies experiencing any symptoms  listed above this past week.  Client reports his next appointment with his therapist and/or psychiatrist is on November 16th.  Client indicated that he is working on family/relationship with his therapist.  Client denied " "suicidal ideation or self-injurious behavior.        DIMENSION 4:  Readiness to Change  Consider the amount of support and encouragement necessary to keep the client involved in treatment.     Readiness to Change - Current Risk Factor:  1    Goals: Client identified a goal to lift his self-esteem.    Data: Client rated their motivation of the week on a scale of 1-10 (1=low, 10= high): 8. Client reports his main motivation for sobriety is \"a solid schedules and group of supporters\". Client reports \"ill- emotion, anger and hurt\" is what blocks motivation for sobriety for him. Client reports using the following coping strategies that are helpful for his continued sobriety: \"self-talk or motivation, breathing techniques.  Client reported that he is currently completing \"Self-esteem transfusion\" assignment      DIMENSION 5:  Relapse/Continued Use/Continued Problem Potential  Consider the degree to which the client recognizes relapse issues and has the skills to prevent relapse of either substance use or mental health problems.     Relapse/Continued Use/Continued Problem Potential - Current Risk Factor:  3    Goals: Increase insight into motivations and consequences for using, develop alternative methods to address these areas. Identify and address relapse triggers and cues.    Data: Client rated his \"Average Craving Rating\" on a scale of 1-10 (1=low, 10= high): 1. Client reports that the following things helped minimize his cravings this past week; \"spirituality and attending out patient groups.\" Client reports that \"arguments at home\" was his biggest trigger for the week. Client indicated that he/she used \"volunteered at [employer] and hung out with family \" to cope with his/her struggles.       DIMENSION 6:  Recovery Environment  Consider the degree to which key areas of the client's life are supportive of or antagonistic to treatment participation and recovery.     Recovery Environment - Current Risk Factor:  3    Goals: " "Implement and/or improve application of coping skills to avoid relapse. Client identified a goal to improve sober hobbies and interests.    Family week dates: NA    Support group meetings attended this week: 1  .    Do you currently have a sponsor: Yes- He will need to call his temporary sponsor    Did you have contact with your sponsor this week? No    Data:  Client rated their living Situation on a scale of 1-10 (1=completely antagonistic 10=totally supportive): 3. Client did no indicate how he built his sober support network this week,  Client reported that he attended [employer] and outpatient groups for his sober activities this week. Client would like to be involved with the \"[employer]\" for his new activities.  Client indicated that he is planning on being gone to [place] for a family wedding over the weekend.      Employment: Client reports he is unemployed.  Volunteerism: No.           Intervention: This week, client participated in the checking in process in a combined IOP group this week.  Clients were shown \"The Art of Assertiveness video tape.  This was shown to help clients learn new ways to communicate.  People develop effective communication through understanding different communication styles, identifying their own communication styles, and utilizing powerful communication techniques that can positively impact relationships with friends, family, and co-workers.         Assessment: Client appears to be in the Stages of Change Model  Preparation/Determination within the stages of change model. Client appeared to understand the concept of the emotions model. Client appeared to understand the importance of checking perception before acting. Client appeared to identify emotions that contribute to his addictive behaviors and appeared to honestly assess his defense mechanisms used in addiction. Client appeared willing to explore how these defense mechanisms had affected those around him.   Client was " encouraged to attend his IOP on Wednesday if he was going to be gone over a long weekend.  Client only attended 1 group this week.     Plan: -Continue client in Phase 1  -Follow up with client regarding temporary sponsor   -Client will be excused 11/1 and 11/2 for a wedding.     Astrid Cotter MA, [facility]

## 2018-10-31 ENCOUNTER — HOSPITAL ENCOUNTER (OUTPATIENT)
Dept: BEHAVIORAL HEALTH | Facility: CLINIC | Age: 43
End: 2018-10-31
Attending: SOCIAL WORKER
Payer: COMMERCIAL

## 2018-10-31 PROCEDURE — H2035 A/D TX PROGRAM, PER HOUR: HCPCS

## 2018-11-01 NOTE — ADDENDUM NOTE
Encounter addended by: Astrid Cotter [facility] on: 11/1/2018  1:26 PM<BR>     Actions taken: Sign clinical note

## 2018-11-01 NOTE — ADDENDUM NOTE
Encounter addended by: Astrid Cotter [facility] on: 10/31/2018  8:29 PM<BR>     Actions taken: Pend clinical note

## 2018-11-01 NOTE — PROGRESS NOTES
"INDIVIDUAL SESSION SUMMARY    D) Met with client on 10/31/18 from 12:30-1:20. Client spoke about his fear of heights and how he will cope by using sleep and his GF for distraction on tomorrow's flight to [facility]. Client spoke with excitement about his suit and his GF's matching dress; how this will be the \"prom\" they didn't get to have. Client spoke about how it was difficulty to adjust his routine these past two days when unexpectedly his GF took two days off. Client stated that he allowed himself to be more flexible with his daily rituals and spend time with his GF.    I) Individual session with client. Provided client with verbal interventions including: validation, nurturing, compassion and support. Helped client identify the fear of relapse behind his desire to stick with a rigid schedule. Discussed how flexibility can he a goal to work towards while the client rebuilds trust with himself.     A) Client appears to be using new tools to improve communication and reduce negative affect at home. Client appears to be rebuilding trust with himself, his GF and his children. Client appears to be using new skills including reading, meetings and exercise for emotional regulation and stress management. Client appears to be connecting with the peers in his aftercare group.      P) Next session is scheduled for 11/8/18.  MARLA Tamayo  11/1/2018    "

## 2018-11-05 ENCOUNTER — HOSPITAL ENCOUNTER (OUTPATIENT)
Dept: BEHAVIORAL HEALTH | Facility: CLINIC | Age: 43
End: 2018-11-05
Attending: SOCIAL WORKER
Payer: COMMERCIAL

## 2018-11-05 PROCEDURE — H2035 A/D TX PROGRAM, PER HOUR: HCPCS | Mod: HQ

## 2018-11-06 ENCOUNTER — HOSPITAL ENCOUNTER (OUTPATIENT)
Dept: BEHAVIORAL HEALTH | Facility: CLINIC | Age: 43
End: 2018-11-06
Attending: SOCIAL WORKER
Payer: COMMERCIAL

## 2018-11-06 PROCEDURE — H2035 A/D TX PROGRAM, PER HOUR: HCPCS | Mod: HQ
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
## 2018-11-08 ENCOUNTER — HOSPITAL ENCOUNTER (OUTPATIENT)
Dept: BEHAVIORAL HEALTH | Facility: CLINIC | Age: 43
End: 2018-11-08
Attending: SOCIAL WORKER
Payer: COMMERCIAL

## 2018-11-08 DIAGNOSIS — Z71.6 ENCOUNTER FOR SMOKING CESSATION COUNSELING: ICD-10-CM

## 2018-11-08 PROCEDURE — H2035 A/D TX PROGRAM, PER HOUR: HCPCS

## 2018-11-08 NOTE — TELEPHONE ENCOUNTER
Spoke with patient-- he will call [facility] to have medication filled.    Erin Quinonez RN  11/09/18  1:12 PM

## 2018-11-08 NOTE — PROGRESS NOTES
Integrative Therapies: Essential Oils    Patient requesting Calm and Citrus essential oil inhaler to manage mood. Discussed appropriate use of essential oil inhalers and instructed patient not to leave labeled product out on unit.     Patient verbalized and demonstrated understanding of how to use essential oil inhaler correctly and will notify LP RN with any concerns or side effects. Patient agrees not to share their essential oil inhaler with other clients.    Continue to support the patient in safely utilizing integrative therapies as able to manage symptoms during treatment.
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
## 2018-11-09 ENCOUNTER — HOSPITAL ENCOUNTER (OUTPATIENT)
Dept: BEHAVIORAL HEALTH | Facility: CLINIC | Age: 43
End: 2018-11-09
Attending: SOCIAL WORKER
Payer: COMMERCIAL

## 2018-11-09 PROCEDURE — H2035 A/D TX PROGRAM, PER HOUR: HCPCS | Mod: HQ

## 2018-11-09 PROCEDURE — H2035 A/D TX PROGRAM, PER HOUR: HCPCS
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
## 2018-11-12 ENCOUNTER — HOSPITAL ENCOUNTER (OUTPATIENT)
Dept: BEHAVIORAL HEALTH | Facility: CLINIC | Age: 43
End: 2018-11-12
Attending: SOCIAL WORKER
Payer: COMMERCIAL

## 2018-11-12 PROCEDURE — H2035 A/D TX PROGRAM, PER HOUR: HCPCS | Mod: HQ

## 2018-11-13 ENCOUNTER — HOSPITAL ENCOUNTER (OUTPATIENT)
Dept: BEHAVIORAL HEALTH | Facility: CLINIC | Age: 43
End: 2018-11-13
Attending: SOCIAL WORKER
Payer: COMMERCIAL

## 2018-11-13 PROCEDURE — H2035 A/D TX PROGRAM, PER HOUR: HCPCS | Mod: HQ

## 2018-11-13 PROCEDURE — H2035 A/D TX PROGRAM, PER HOUR: HCPCS

## 2018-11-14 NOTE — PROGRESS NOTES
"Individual Session Summary    Data: Met with client for 1 hour from 4 PM to 5 PM 11/13/18. Client reported he felt conflicted about setting boundaries with a group member and saying he couldn't give his number out for support. Client reported he felt triggered/experienced craving when listening to the group member talk about relapsing. Client reported in the past he had given his number out to \"everybody\" and then would find himself spread thin trying to support everyone \"when they fell off\", or it would be \"too easy\" to contact someone when he decided to use. Client reported he felt bad setting this boundary. Client reported he spoke with his significant other about the boundary, and she encouraged him to talk it over with the group member to make it clear it was for client's wellbeing, not anything personal against the group member. Client reported he is job searching for something to fill his time as he plans to transition to Phase 2, but long term wants to be in a coaching role, or in a role wherein he is helping others. 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 Client reported he recognized that this may not be the best option now as client is focusing on his early recovery, but is interested in it long-term. Client reported he is less focused on money, and more focused on what will help him feel fulfilled and motivated. Client reported he continues to struggle with parts of his childhood memory missing, and is conflicted about whether to address it in therapy. Client reported he has completed many of his treatment plan assignments and would like to work on more.     Intervention: Counselor used active listening, validation to facilitate client sharing. Counselor reinforced client's efforts setting boundaries with a group member. Counselor discussed the concept of self-sacrifice/martyrdom, discussed how it is often encouraged to put others' needs " before your own, but in recovery that can mean jeopardizing one's own sobriety which ultimately helps no one. Counselor emphasized the importance of setting boundaries in recovery and placing one's safety and sobriety first, then helping others when possible. Counselor validated client's discomfort with setting boundaries and encouraged continued practice. Counselor encouraged client to speak with MARLA Estevez re: childhood memories, and discuss options for long-term addressing this issue in individual therapy. Counselor provided client with extra assignments to work on. Counselor provided     Assessment: Client appears to be building insight into his need for boundaries and limitations in early recovery. Client appears to be adequately preparing for stepping down to Phase 2. Client may benefit from long-term addressing childhood experiences in individual therapy.     Plan: -Follow up with client regarding new assignments  -Client will begin looking into alternative support group options and [facility]  -Client will continue job searching   -Transition client to Phase 2      Diana Steward [facility]

## 2018-11-15 ENCOUNTER — HOSPITAL ENCOUNTER (OUTPATIENT)
Dept: BEHAVIORAL HEALTH | Facility: CLINIC | Age: 43
End: 2018-11-15
Attending: SOCIAL WORKER
Payer: COMMERCIAL

## 2018-11-15 PROCEDURE — H2035 A/D TX PROGRAM, PER HOUR: HCPCS | Mod: HQ

## 2018-11-15 PROCEDURE — H2035 A/D TX PROGRAM, PER HOUR: HCPCS
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
## 2018-11-15 NOTE — PROGRESS NOTES
[facility] INTENSIVE OUTPATIENT PROGRAM  TRANSITION PROGRESS NOTE     Patient: Eric Escobar  MRN; [id number]   : 1975  Age: 43 year old Sex: male  IOP ADMISSION DATE: 10/8/18  TRANSITION DATE: 18  TRANSITIONING FROM: Phase 1 TO: Phase 2  SUBSTANCE USE DISORDERS:   Stimulant Related Disorder Severe (F14.20)  Cocaine (Crack- primary drug used)   304.40 (F15.20) Amphetamine Use Disorder Moderate  305.00 (F10.10) Alcohol Use Disorder Mild  305.20 (F12.10) Cannabis Use Disorder Mild  305.10 (F17.200)  Tobacco Use Disorder Moderate   LAST USE DATE: 18    Treatment Plan Review completed on:  11/15/2018    Attendance Dates: 11/12/18 11/13/18 11/15/2018 and 18      Total # of P1 Group Sessions: 19  Total # of P2 Group Sessions: NA  Total # of P3 Group Sessions: NA  Total # of 1:1 Sessions: 5     Length of stay/projected discharge date: 19.      Total   Group Therapy 2   2 2   6   Specialty Groups*  2      2   1:1  1  1 (Melissa)    2   Family Program           [place]             Phase III             Absent           Total 2 3  3 2   10     *Specialty Groups include Mental Health Care, Assertiveness and Communication, Sobriety Maintenance Skills, Spiritual Care, Stress Management, Relapse Prevention, Family Systems.                    Learning Style(s):    Hands on  Verbal  Reading    Staff member contributing: KIRTI Ledesma     Received supervision: No.    Client contributed to goals and plan: Yes    Client received a signed copy of treatment plan/revised plan: Yes    Changes to Treatment Plan: No.    Client agrees with plan/revised plan: Yes    Any changes in Vulnerable Adult Status: No    1) Care Coordination Activities: Yes (explain) - Coordinated care with MARLA Estevez.  2) Medical, Mental Health and other appointments the client attended: Met with MARLA Estevez   3) Medication issues:  "No.  4) Physical and mental health problems: Yes (explain) - 18: \"weight gain\".  5) Review and evaluation of the individual abuse prevention plan: NA.  Dimension One: Acute Intoxication/Withdrawal Potential     Risk at admission to IOP: 0. Risk at transition to Phase 2: Risk Ratin.     Treatment plan goal:   Develop effective strategies to maintain sobriety. Goal Status: CONTINUE.     Current ASAM criteria: no withdrawal risk.     Additional comments: 18: Client denied nor demonstrated any signs/symptomology of intoxication and/or withdrawal. Client confirmed his last use date was on 18.       Dimension Two: Biomedical Conditions and Complaints     Risk at admission to IOP: 0. Risk at transition to Phase 2: Risk Ratin.     Treatment plan goal:   Disclose CD status to medical providers and follow up with medical interventions while in treatment program. CONTINUE.  Maintain healthy exercise and diet. CONTINUE.     Current ASAM criteria: none.     Additional comments:   18: Client reports attending the following doctor s appointments over the past week: None. Client reports the following changes to his physical health over the past week: \"weight gain\". Client remains capable of autonomous healthcare.     Current Outpatient Prescriptions   Medication     nicotine (NICODERM CQ) 21 MG/24HR 24 hr patch     nicotine polacrilex (COMMIT) 2 MG lozenge     nicotine polacrilex (NICORETTE) 2 MG gum     omeprazole (PRILOSEC) 20 MG CR capsule     No current facility-administered medications for this encounter.      Facility-Administered Medications Ordered in Other Encounters   Medication     Self Administer Medications: Behavioral Services        Dimension Three: Emotional/Behavioral/Cognitive Conditions and Complications     Risk at admission to IOP: 1. Risk at transition to Phase 2: Risk Ratin.     Treatment plan goal:   Learn how to apply self-care and psychotherapy to build a repertoire of " "daily habits that counteract PAWS, fatigue, depression and anxiety leading to increased resiliency and well-being. COMPLETE.  Client identified a goal learn how to identify emotions and express them in healthy ways, as well as practice \"recognizing what's underneath\".  . CONTINUE.   Client identified a goal to find a sense of purpose, and better recognize \"strengths vs weaknesses.\" CONTINUE     Current ASA criteria: stable and manageable     Additional comments: 18: Client denied suicidal ideation or self-injurious behavior. Client rated the following MH symptoms on a scale of 1-10 (0=did not endorse): sleeping more than usual- 0, fatigue- 0, difficulty concentrating- 0, restlessness- 0, mood swings- 0, irritability- 1, lack of interest or pleasure in activities- 0, hopelessness- 0, muscle tension- 0, shakiness- 0, sadness- 0, worry/rumination- 0, increased energy- 3, grandiosity- 0, sleeping less than usual- 0, appetite changes- 4, intrusive/distressing memories- 0, difficulty falling or staying asleep- 2.  Client reports that he coped with the aforementioned symptomology by \"breathing techniques.\" Client reported a mental health goal this week of: \"stay calm\".      Progress: Client has demonstrated progress in this area including increased awareness and insight into his mental health and how his mental health relates to his substance use/recovery. Client has been consistently meeting individually with MARLA Estevez to address mental health concerns and with his primary counselor as well. Client has increased understanding of the emotions model and how thoughts and emotions affect action urges/actions. Client attended 5 of the 6 required skills groups on mental health and recovery to sufficiently complete one Dim 3 goal.     Dimension Four: Readiness to Change     Risk at admission to [facility]: 1. Risk at transition to Phase 2: Risk Ratin.     Treatment plan goal:   Client identified a goal to lift his " "self-esteem. COMPLETE.   Increase insight into motivations and consequences for using, develop alternative methods to address these areas. COMPLETE     Current ASAM criteria: cooperative and motivated.     Additional comments: 11/12/18: Client reported he moved his life forward this week by: \"stood up for myself\".    11/15/2018: Client presented his \"motives and consequences assignment. Client reported he experienced positives of his substances including temporary relaxation, sense of purpose, feeling alert/aware, motivation/confidence. Client reported he experienced negatives of using including: nursing home time, \"chasing\", \"not being concerned about family\", risk-taking (STIs, etc.), withdrawal, irritability, loneliness, intense emotions when the drugs wore off. Client reported his positives of sobriety included positive friendships, physical health, connected with higher power, being more honest, motivated, and  Happy, feeling \"closer to being who I want to be\". Client reported another positive of sobriety is \"I'm living, not just simply existing\", and \"feeling great about the me I am today\". Client reported the negatives of sobriety included withdrawal, and having to redefine himself/learn who he is all over again.     Assessment: Client appeared to gain increased insight into his motivations for using, motivations for sobriety, and the consequences of using/sobriety. Client appeared to recognize that the positives of using/negatives of sobriety could be addressed through shifting perceptions and applying coping skills.     Progress: Client has demonstrated progress in this area by consistently participating and attending treatment. Client has become more aware of assertiveness skills/boundary setting and has demonstrated these skills with group members and reports he is using these skills outside of treatment as well. Client appears to be gaining increased awareness of his self-esteem and has completed the treatment " "strategy of a self-esteem transfusion schedule to improve his self-esteem. Goals in both of these dimensions are complete, though client and counselor will continue to focus on building/maintaining his self-esteem and self-conceptualization.     Dimension Five: Relapse/Continues Use/Continues Problem Potential     Risk at admission to [facility]: 3. Risk at transition to Phase 2: Risk Ratin.     Treatment plan goal:   Identify and address relapse triggers and cues. CONTINUE.  Implement and/or improve application of coping skills to avoid relapse. CONTINUE.     Current ASAM criteria: moderate risk of relapse and client has coping skills     Additional comments:   Data: 18: Client rated his cravings this week on a scale of 1-10 (1=none, 10= \"I used\"): 1. Client did not identify any triggers he experienced this week or coping skills applied. 18: Client reported he has experienced triggers in the past such as the smell of citrus, as this was the air freshener he used to try and cover up his use. Client reported it helped to understand the concept of \"classical conditioning\" to better understand how he has developed certain triggers and cues, and it was helpful to know that they extinguish over time with sobriety and not following through on triggers.     Intervention: Client participated in a skills group on cravings, wherein he learned to better understand the mechanisms behind cravings, learned how to describe them, began identifying/exploring triggers in his life, and reviewed the following coping skills: distraction, talking with others, \"going with the craving\" (urge surfing), recalling negative consequences, and positive self-talk. Client participated in an activity creating a trigger lock, to use when experiencing triggers.     Assessment: Dim 5 risk rating reduced from 3 to 2 due to client's increased awareness of triggers and cravings. Client has reported minimal to no cravings multiple weeks, and has " successfully applied coping skills in high-risk situations (e.g. Being offered alcohol by family). Client's goals in these dimensions are continued as client continues to have moderate risk to relapse due to his significant history of relapse after early recovery, polysubstance abuse, and current lack of structure (dim 6).     Plan: Transition client to Phase 2, continue working on tx plan goals.     Progress: See DIAP above.     Dimension Six: Recovery Environment     Risk at admission to [facility]: 3. Risk at transition to Phase 2: Risk Rating: 3.     Treatment plan goal:   Client identified a goal to improve sober hobbies and interests. CONTINUE.     Current ASAM criteria: NOT engaged in structured and meaningful activity, but client has coping skills     Additional comments: Client rated their living Situation on a scale of 1-10 (1=completely antagonistic 10=totally supportive): 8. Client reports he missed the Sunday AA meeting the past two Sundays, one week due to being out of town and the second due to his daughter's baby shower. Client reported he was unsure how to build his sober support network. 11/13/18: Counselor provided client with resources for [facility], [facility], [facility], and [place] [facility]. Client reported a plan to review these resources and begin exploring ways to build his sober support network.    Progress: Client has improved communication skills, assertiveness skills and boundary setting with those around him. Risk rating remains a 3 as client is unemployed and lacks significant meaningful structure outside of treatment. Risk rating will decrease as client begins developing more structure and routine, engagement with positive and meaningful activities, and continues to demonstrate healthy communication.      PLAN: Transition client to Phase 2 effective 11/19/18 due to decreasing risk levels in Dim 4 and 5. Client appears ready to begin focusing on building the long-term community  supports and structure for long-term recovery, and will begin focusing more on these areas as his treatment days gradually decrease.   Client currently meets the ASAM criteria for; Outpatient (ASAM level 1)  level of care.    KIRTI Ledesma

## 2018-11-16 ENCOUNTER — HOSPITAL ENCOUNTER (OUTPATIENT)
Dept: BEHAVIORAL HEALTH | Facility: CLINIC | Age: 43
End: 2018-11-16
Attending: SOCIAL WORKER
Payer: COMMERCIAL

## 2018-11-16 PROCEDURE — H2035 A/D TX PROGRAM, PER HOUR: HCPCS | Mod: HQ

## 2018-11-19 ENCOUNTER — HOSPITAL ENCOUNTER (OUTPATIENT)
Dept: BEHAVIORAL HEALTH | Facility: CLINIC | Age: 43
End: 2018-11-19
Attending: SOCIAL WORKER
Payer: COMMERCIAL

## 2018-11-19 ENCOUNTER — TELEPHONE (OUTPATIENT)
Dept: BEHAVIORAL HEALTH | Facility: CLINIC | Age: 43
End: 2018-11-19

## 2018-11-19 PROCEDURE — H2035 A/D TX PROGRAM, PER HOUR: HCPCS | Mod: HQ

## 2018-11-20 ENCOUNTER — HOSPITAL ENCOUNTER (OUTPATIENT)
Dept: BEHAVIORAL HEALTH | Facility: CLINIC | Age: 43
End: 2018-11-20
Attending: SOCIAL WORKER
Payer: COMMERCIAL

## 2018-11-20 PROCEDURE — H2035 A/D TX PROGRAM, PER HOUR: HCPCS | Mod: HQ

## 2018-11-20 NOTE — PROGRESS NOTES
CD ADULT Progress Note     Treatment Plan Review completed on:  11/20/2018     Attendance Dates: 11/19/18 and 11/20/2018     Total # of P1 Group Sessions: 19  Total # of P2 Group Sessions: 2  Total # of P3 Group Sessions: NA  Total # of 1:1 Sessions: 3     Projected discharge date:      MONDAY TUESDAY WEDNESDAY THURSDAY FRIDAY SATURDAY SUNDAY Total   Group Therapy 2       2   Specialty Groups*  2      2   1:1           Family Program           [place]             Phase II             Absent           Total 2 2      4       *Specialty Groups include Mental Health Care, Assertiveness and Communication, Sobriety Maintenance Skills, Spiritual Care, Stress Management, Relapse Prevention, Family Systems.                    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 Use Disorder Moderate  304.20 (F14.20) Cocaine Use Disorder Severe  305.10 (F17.200)  Tobacco Use Disorder Moderate     1) Care Coordination Activities: Reviewed session notes by MARLA Estevez  2) Medical, Mental Health and other appointments the client attended: Individual with Melissa 11/15, individual with old primary therapist Greta 11/16  3) Medication issues: None reported  4) Physical and mental health problems: None reported  5) Review and evaluation of the individual abuse prevention plan: NA     [facility] Risk Ratings and Data       DIMENSION 1: Acute Intoxication/Withdrawal  The client's ability to cope with withdrawal symptoms and current state of  "intoxication       Acute Intoxication/Withdrawal - Current Risk Factor:  0    Reporting sober date of 9/3/18    Goals: Develop effective strategies to maintain sobriety.     Data: 11/19/18: Client denied nor demonstrated any signs/symptomology of intoxication and/or withdrawal. Client confirmed his last use date was on 9/2/18. UA completed 11/6/2018 NEG all substances.     DIMENSION 2:  Biomedical Conditions and Complaints  The degree to which any physical disorder would interfere with treatment for substance abuse and the client's ability to tolerate any related discomfort     Biomedical Conditions and Complaints - Current Risk Factor:  0    Goals: Disclose CD status to medical providers and follow up with medical interventions while in EOP.     Data: 11/19/18: Client reports attending the following doctor s appointments over the past week: None. Client reports the following changes to his physical health over the past week: \"None\". Client remains capable of autonomous healthcare.     Current Outpatient Prescriptions   Medication     nicotine (NICODERM CQ) 21 MG/24HR 24 hr patch     nicotine polacrilex (COMMIT) 2 MG lozenge     nicotine polacrilex (NICORETTE) 2 MG gum     omeprazole (PRILOSEC) 20 MG CR capsule     No current facility-administered medications for this encounter.      Facility-Administered Medications Ordered in Other Encounters   Medication     Self Administer Medications: Behavioral Services       DIMENSION 3:  Emotional/Behavioral/Cognitive Conditions and Complications  The degree to which any condition or complications are likely to interfere with treatment for substance abuse or with function in significant life areas and the likelihood of risk of harm to self or others.     Emotional/Behavioral - Current Risk Factor:  1    DSM-5 Diagnoses:  Unclear- hx of bipolar diagnosis, but reports he suspects he has depression. Mental health diagnoses may have been confounded by substance use. DA may be " "warranted after client has sufficient sobriety.     Suicide Assessment:   Risk Status    Ideation - Active thoughts of suicide Intent to follow through on suicide Plan for completing suicide    Yes No Yes No Yes No   Emergent  x  x  x   Urgent / Non-Emergent  x  x  x   Non- Urgent  x  x  x   No Current/Active Risk  x  x  x        Goals: Client identified a goal to learn how to identify emotions and express them in healthy ways, as well as practice \"recognizing what's underneath\". Client identified a goal to find a sense of purpose, and better recognize \"strengths vs weaknesses.\" Learn how to apply self-care and psychotherapy to build a repertoire of daily habits that counteract PAWS, fatigue, depression, anxiety and increase resiliency and well-being.      Data: 11/19/18: Client denied suicidal ideation or self-injurious behavior. Client reported no mental health symptoms this week.     DIMENSION 4:  Readiness to Change  Consider the amount of support and encouragement necessary to keep the client involved in treatment.     Readiness to Change - Current Risk Factor:  1    Goals: Client identified a goal to lift his self-esteem.    Data: 11/19/18: Client reported he moved his life forward this week by: \"let go and let God\".       DIMENSION 5:  Relapse/Continued Use/Continued Problem Potential  Consider the degree to which the client recognizes relapse issues and has the skills to prevent relapse of either substance use or mental health problems.     Relapse/Continued Use/Continued Problem Potential - Current Risk Factor:  3    Goals: Increase insight into motivations and consequences for using, develop alternative methods to address these areas. Identify and address relapse triggers and cues.    Data: 11/19/18: Client rated his cravings this week on a scale of 1-10 (1=none, 10= \"I used\"): 2. Client identified the following triggers that contributed to his cravings: \"thingsor people I can't control\". Client reported " "using the following coping skills helped minimize his cravings this past week: \"distraction by reading, working out.\"       DIMENSION 6:  Recovery Environment  Consider the degree to which key areas of the client's life are supportive of or antagonistic to treatment participation and recovery.     Recovery Environment - Current Risk Factor:  3    Goals: Implement and/or improve application of coping skills to avoid relapse. Client identified a goal to improve sober hobbies and interests.    Data: 11/19/18: Client rated their living Situation on a scale of 1-10 (1=completely antagonistic 10=totally supportive): 7. Client reports that this is due to: \"ANDER, but it seems I'm' the only person trying to work on the relationship\". Client reports during the past week that he built their sober support network by \"meetings and making every group session\". Client reported a plan to build their sober support network this week by \"looking for work\". Client reported he would like sober housing resources. Client reported he and his girlfriend have been arguing more frequently. Client reported he doesn't feel supported putting his recovery first. Client reported \"things have been going backwards\" ever since their [place] trip. Client reported he received his 2 month coin 11/18/18 and his girlfriend instead of supporting him, complained about how long she waited for the meeting to end. Client reported he was feeling \"rejected\" so he had a difficult time being supportive when she shared with him that her grandfather's wife was on hospice. Client reported he is tired of people \"holding on to shit from the past\" and needs to make his recovery a priority. Client reported he has an interview 11/20/2018 and will need to be earning money in order to move into a sober house.   11/20/2018: Client reported he completed his interview today and it went well, reporting he starts the new job 11/26. Client reported he heard from the temporary " "sponsor and will meet with him 11/30. Client reported he recognizes the part he played in his argument with his girlfriend on 11/18 (above) and wants to work on being more supportive of her going forward. Client completed a lifestyle balance pie and reported the area that is most out of balance is \"romance\" which he reported was a 1 on a scale of 1-10. Client reported this area \"needs a lot of work\" and he wants to work on this area the most. Client reported \"what I bring to the table needs to be worked on\", and he recognizes he can't control how much the other person works on the relationship. Client reported he had weekend plans of Thanksgiving with family, spending time with his family, movies, Voodoo, and Sunday evening meeting. Client reported he is learning that having some down time is not always a bad thing, and he is learning to cope with some down time.     Intervention: Client was given group time to process his difficulties at home. Counselor encouraged client to practice mindfulness prior to making any decisions, \"respond vs react\". Counselor utilized cognitive behavioral therapy, motivational interviewing techniques. Counselor utilized validation, thought re-framing, self-esteem building and strengths-building techniques. Client completed a lifestyle balance pie, and a weekend planning sheet, to plan for the holiday weekend.     Assessment: Client appears to be in the Stages of Change Model  Action within the stages of change model. Client appears to be struggling with his environment, and appears to have gained insight into the relationship between his environment/interaction with family, and triggers/cravings he experiences. Client may benefit from establishing sober housing, but may also benefit from addressing his challenges at home with his significant other and building healthier communication styles.     Plan: -Continue client in Phase 2  -Assist client in researching/setting up sober housing, " weighing pros and cons  -Continue to encourage engagement with individual therapy, and continue to encourage eventual engagement with couples therapy          Diana Steward, NELSON LADC

## 2018-11-26 ENCOUNTER — HOSPITAL ENCOUNTER (OUTPATIENT)
Dept: BEHAVIORAL HEALTH | Facility: CLINIC | Age: 43
End: 2018-11-26
Attending: SOCIAL WORKER
Payer: COMMERCIAL

## 2018-11-26 ENCOUNTER — TELEPHONE (OUTPATIENT)
Dept: BEHAVIORAL HEALTH | Facility: CLINIC | Age: 43
End: 2018-11-26

## 2018-11-26 PROCEDURE — H2035 A/D TX PROGRAM, PER HOUR: HCPCS | Mod: HQ

## 2018-11-26 NOTE — TELEPHONE ENCOUNTER
Client's therapist, Greta Muller, called to consult. She agreed to resume weekly sessions with client when he's done with IOP. Touch base in one month.

## 2018-11-27 ENCOUNTER — HOSPITAL ENCOUNTER (OUTPATIENT)
Dept: BEHAVIORAL HEALTH | Facility: CLINIC | Age: 43
End: 2018-11-27
Attending: SOCIAL WORKER
Payer: COMMERCIAL

## 2018-11-27 PROCEDURE — H2035 A/D TX PROGRAM, PER HOUR: HCPCS | Mod: HQ

## 2018-11-29 ENCOUNTER — HOSPITAL ENCOUNTER (OUTPATIENT)
Dept: BEHAVIORAL HEALTH | Facility: CLINIC | Age: 43
End: 2018-11-29
Attending: SOCIAL WORKER
Payer: COMMERCIAL

## 2018-11-29 PROCEDURE — H2035 A/D TX PROGRAM, PER HOUR: HCPCS | Mod: HQ

## 2018-11-29 NOTE — PROGRESS NOTES
CD ADULT Progress Note     Treatment Plan Review completed on:  11/29/2018     Attendance Dates: 11/26/18 11/27/18 11/29/2018     Total # of P1 Group Sessions: 19  Total # of P2 Group Sessions: 5  Total # of P3 Group Sessions: NA  Total # of 1:1 Sessions: 3     Projected discharge date: 2/12/19 MONDAY TUESDAY WEDNESDAY THURSDAY FRIDAY SATURDAY SUNDAY Total   Group Therapy 2   2    4   Specialty Groups*  2      2   1:1           Family Program           [place]             Phase II             Absent           Total 2 2  2    6       *Specialty Groups include Mental Health Care, Assertiveness and Communication, Sobriety Maintenance Skills, Spiritual Care, Stress Management, Relapse Prevention, Family Systems.                    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 Use Disorder Moderate  304.20 (F14.20) Cocaine Use Disorder Severe  305.10 (F17.200)  Tobacco Use Disorder Moderate     1) Care Coordination Activities: Reviewed case with MARLA Estevez  2) Medical, Mental Health and other appointments the client attended: Individual with Melissa 11/29/2018   3) Medication issues: None reported  4) Physical and mental health problems: None reported  5) Review and evaluation of the individual abuse prevention plan: NA     [facility] Risk Ratings and Data       DIMENSION 1: Acute Intoxication/Withdrawal  The client's ability to cope with withdrawal symptoms and current state of intoxication       Acute Intoxication/Withdrawal -  "Current Risk Factor:  0    Reporting sober date of 9/3/18    Goals: Develop effective strategies to maintain sobriety.     Data: 11/26/18: Client denied nor demonstrated any signs/symptomology of intoxication and/or withdrawal. Client confirmed his last use date was on 9/2/18. UA completed 11/6/2018 NEG all substances.     DIMENSION 2:  Biomedical Conditions and Complaints  The degree to which any physical disorder would interfere with treatment for substance abuse and the client's ability to tolerate any related discomfort     Biomedical Conditions and Complaints - Current Risk Factor:  0    Goals: Disclose CD status to medical providers and follow up with medical interventions while in EOP.     Data: 11/26/18: Client reports attending the following doctor s appointments over the past week: None. Client reports the following changes to his physical health over the past week: \"None\". Client remains capable of autonomous healthcare.     Current Outpatient Prescriptions   Medication     nicotine (NICODERM CQ) 21 MG/24HR 24 hr patch     nicotine polacrilex (COMMIT) 2 MG lozenge     nicotine polacrilex (NICORETTE) 2 MG gum     omeprazole (PRILOSEC) 20 MG CR capsule     No current facility-administered medications for this encounter.      Facility-Administered Medications Ordered in Other Encounters   Medication     Self Administer Medications: Behavioral Services       DIMENSION 3:  Emotional/Behavioral/Cognitive Conditions and Complications  The degree to which any condition or complications are likely to interfere with treatment for substance abuse or with function in significant life areas and the likelihood of risk of harm to self or others.     Emotional/Behavioral - Current Risk Factor:  1    DSM-5 Diagnoses:  Unclear- hx of bipolar diagnosis, but reports he suspects he has depression. Mental health diagnoses may have been confounded by substance use. DA may be warranted after client has sufficient sobriety. " "    Suicide Assessment:   Risk Status    Ideation - Active thoughts of suicide Intent to follow through on suicide Plan for completing suicide    Yes No Yes No Yes No   Emergent  x  x  x   Urgent / Non-Emergent  x  x  x   Non- Urgent  x  x  x   No Current/Active Risk  x  x  x        Goals: Client identified a goal to learn how to identify emotions and express them in healthy ways, as well as practice \"recognizing what's underneath\". Client identified a goal to find a sense of purpose, and better recognize \"strengths vs weaknesses.\" Learn how to apply self-care and psychotherapy to build a repertoire of daily habits that counteract PAWS, fatigue, depression, anxiety and increase resiliency and well-being.      Data: 11/26/18: Client denied suicidal ideation or self-injurious behavior. Client rated the following MH symptoms on a scale of 1-10 (0=did not endorse): sleeping more than usual- 0, fatigue- 0, difficulty concentrating- 0, restlessness- 0, mood swings- 0, irritability- 3, lack of interest or pleasure in activities- 0, hopelessness- 0, muscle tension- 0, shakiness- 0, sadness- 0, worry/rumination- 0, increased energy- 0, grandiosity- 0, sleeping less than usual- 0, appetite changes- 0, intrusive/distressing memories- 0, difficulty falling or staying asleep- 0.  Client reports that he coped with the aforementioned symptomology by \"spiritual studying, 4-7-8 breathing, exercise.\" Client reported a mental health goal this week of: \"focus on calmness and contentment\".     DIMENSION 4:  Readiness to Change  Consider the amount of support and encouragement necessary to keep the client involved in treatment.     Readiness to Change - Current Risk Factor:  1    Goals: Client identified a goal to lift his self-esteem.    Data: 11/26/18: Client reported he moved his life forward this week by: \"strengthening my acceptance of what I can't control and what I can\". 11/27/18: Client reported using aligned with his core values " "of challenge and persistence, but reported using was oppositional to the rest of his values. Client reported he wants to live within all not just some of his core values, and recovery aligns with all of his values. 11/29/2018: Client reported he started his new job and it is going well. Client reported he was \"exhausted\" \"mentally and physically\". Client reported he is being put into a \"leadership position\" with work. Client reported he aims to buy a car with his first wages, and then his money will be put into a bank account that he doesn't have access to. Client reported he is on \"very high alert\" to triggers and cravings, particularly as he is feeling \"overwhelmed\" in his new job.     Intervention: Client was given group time to process his week. Counselor utilized cognitive behavioral therapy, motivational interviewing techniques. Counselor utilized validation, thought re-framing, self-esteem building and strengths-building techniques. Client participated in a skills group on values wherein he learned how values are formed, how they guide our decision-making and goal-setting, and how living in accordance or not in accordance with our values will affect our emotions and self-esteem. Client learned about values conflicts. Client participated in an activity wherein he identified his 5 core values, and created a goal to live more in accordance to one of his 5 core values.    Assessment: Client appears to be in the Stages of Change Model  Action within the stages of change model. Client appeared to understand the importance of living in accordance with one's values, and the importance of values for decision-making and goal-setting. Client appeared tired in the group setting possibly due to now working 40 hours/week.     Plan: -Continue client in Phase 2, begin discussing drop down due to client now working 40 hours a week.      DIMENSION 5:  Relapse/Continued Use/Continued Problem Potential  Consider the degree to " "which the client recognizes relapse issues and has the skills to prevent relapse of either substance use or mental health problems.     Relapse/Continued Use/Continued Problem Potential - Current Risk Factor:  3    Goals: Increase insight into motivations and consequences for using, develop alternative methods to address these areas. Identify and address relapse triggers and cues.    Data: 11/26/18: Client rated his cravings this week on a scale of 1-10 (1=none, 10= \"I used\"): 6. Client identified the following triggers that contributed to his cravings: \"too much outside stuff\". Client reported he experienced \"real bad\" cravings since Thanksgiving. Client reported he was triggered when his girlfriend mocked his concern to her friends, reported he felt rejected, humiliated, embarrassed. Client reported he was \"struggling to concentrate\" even on his spiritual readings, so he began using the [employer]. Client reported using the following coping skills helped minimize his cravings this past week: \"playing tape through/exercise.\" Client reported a goal to practice the following coping skill further: \"exercise\". Client identified that using is a \"flight\" behavior, as it gives him a reason/way to avoid a problem, and client identified with the idea that it is a way to hurt his girlfriend back after he feels hurt/rejected. 11/29/2018: Client completed a behavior chain analysis for his isolative/emotionally withdrawing behavior toward his girlfriend after she embarrassed him. Client identified the thoughts, emotions, actions that contributed to this behavior, consequences to himself and his environment as a result of his behavior, and ways to correct/repair harm going forward. Client identified that the emotionally withdrawing had been \"spiteful\" because he was feeling hurt, and client reported this reminded him he needs to step back when feeling anger and re-evaluate the decision before acting.     Intervention: Client " "was given group time to process his cravings. Counselor encouraged client to identify the emotions that he experienced prior to the craving. Counselor shared observation that these painful emotions trigger client to want to \"run away\" \"escape\" or engage in fight/flight/freeze. Counselor encouraged client to identify whether using is fight/flight/freeze. Counselor shared observation that using for client has also been a way of hurting his girlfriend when he feels hurt/rejected, rather than communicating assertively with her.      Assessment: Client appears to be in the Stages of Change Model  Action within the stages of change model. Client appeared willing to shift his perceptions of his cravings and consider the purpose of his past behavior of using as a way to escape negative emotions and as a way to hurt those who he feels hurt him. Client appeared to gain insight into how his emotionally withdrawing behavior was harmful to himself and others rather than productive.     Plan: -Continue facilitating increased insight into his triggers/past using behaviors    DIMENSION 6:  Recovery Environment  Consider the degree to which key areas of the client's life are supportive of or antagonistic to treatment participation and recovery.     Recovery Environment - Current Risk Factor:  3    Goals: Implement and/or improve application of coping skills to avoid relapse. Client identified a goal to improve sober hobbies and interests.    Data: 11/26/18: Client rated their living Situation on a scale of 1-10 (1=completely antagonistic 10=totally supportive): 7. Client reports that this is due to: \"I can't explain where someone else's thinking or what their motives are\". Client reports during the past week that he built their sober support network by \"downloaded a new [employer] called [employer] where I can network and it's awesome\". Client reported a plan to build their sober support network this week by \"go to meetings and keep coming " "to group\". 11/29/2018: Client reported he will be meeting with his temporary sponsor, Darius, on 11/30/18.          Diana Steward [facility]   "

## 2018-12-03 ENCOUNTER — HOSPITAL ENCOUNTER (OUTPATIENT)
Dept: BEHAVIORAL HEALTH | Facility: CLINIC | Age: 43
End: 2018-12-03
Attending: SOCIAL WORKER
Payer: COMMERCIAL

## 2018-12-03 PROCEDURE — H2035 A/D TX PROGRAM, PER HOUR: HCPCS | Mod: HQ

## 2018-12-04 ENCOUNTER — HOSPITAL ENCOUNTER (OUTPATIENT)
Dept: BEHAVIORAL HEALTH | Facility: CLINIC | Age: 43
End: 2018-12-04
Attending: SOCIAL WORKER
Payer: COMMERCIAL

## 2018-12-04 PROCEDURE — H2035 A/D TX PROGRAM, PER HOUR: HCPCS

## 2018-12-05 NOTE — PROGRESS NOTES
"INDIVIDUAL SESSION SUMMARY    D) Met with client on 12/5/18 from 4:30-5:30. Client and therapist discussed the transition back to working with his community therapist, Greta Muller, when he completes his IOP group. Client spoke about an incident that occurred Thanksgiving weekend with his GF that had him feeling \"hurt\". Client reported that his GF was talking about him in a \"mocking\" tone with her girlfriends in front of the client. Client stated that he shared his feelings with his GF and that he told her would pull back from initiating any sexual intimacy at this time. Client reported feeling stress related to information about his pregnant daughter and the man she is dating; that he caught his daughter lying to the family and that he set a \"boundary\" with his daughter in regards to not wanting to hear or be asked to listen to her talk about her current BF. Client reported setting a boundary with his son as well regarding finances. Client reported that his job is physically demanding and that he's working four 10 hour days with a three day weekend Friday - Sunday. Client reported that he'd like to continue with his current IOP schedule for now and avoid any more changes to his schedule. For self care, client reported working on healthy boundaries with family, regular time at the gym with his older daughter, volunteering at [employer] and continuing to attend IOP. Client discussed how the Thanksgiving weekend caused him to consider moving into a sober house but that he chose not to move out in order to give his GF more time to adjust to him being home and work on their relationship. Client stated \"I've put her through a lot and I can be patient\". Client stated that he wished his GF would attend Al-Anon or find a therapist for support.     I) Individual session with client. Provided client with verbal interventions including: validation, compassion and support. Therapist discussed the benefit of sharing his " feelings in a timely manner in order to not let resentments build. Therapist highlighted examples of client working to set healthy boundaries and break old negative communication patterns.     A) Client appears to be using new tools to improve communication and reduce negative affect at home. Client appears to be rebuilding trust with himself, his GF and his children. Client appears to have more awareness of his tendency to react impulsively and is running the tape in order to break old patterns. Client appears to be using new skills including reading, meetings and exercise for emotional regulation and stress management.     P) Next session is scheduled for 12/11/18.   MARLA Tamayo  12/5/2018

## 2018-12-05 NOTE — PROGRESS NOTES
CD ADULT Progress Note     Treatment Plan Review completed on: 12/6/18     Attendance Dates: 12/3/18 12/6/18     Total # of P1 Group Sessions: 19  Total # of P2 Group Sessions: 7  Total # of P3 Group Sessions: NA  Total # of 1:1 Sessions: 9 (including individual sessions with MARLA Estevez    Projected discharge date: 2/12/19 MONDAY TUESDAY WEDNESDAY THURSDAY FRIDAY SATURDAY SUNDAY Total   Group Therapy 2   2    4   Specialty Groups*           1:1  1 (Melissa)      1   Family Program           [place]             Phase II             Absent  Excused- exhaustion         Total 2 1  2    5     *Specialty Groups include Mental Health Care, Assertiveness and Communication, Sobriety Maintenance Skills, Spiritual Care, Stress Management, Relapse Prevention, Family Systems.                    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 Use Disorder Moderate  304.20 (F14.20) Cocaine Use Disorder Severe  305.10 (F17.200)  Tobacco Use Disorder Moderate     1) Care Coordination Activities: Reviewed case with MARLA Estevez  2) Medical, Mental Health and other appointments the client attended: Individual with Melissa 12/4/18   3) Medication issues: None reported  4) Physical and mental health problems: None reported  5) Review and evaluation of the individual abuse prevention plan: NA     [facility] Risk Ratings and Data       DIMENSION 1: Acute Intoxication/Withdrawal  The client's ability to cope with withdrawal symptoms and  "current state of intoxication       Acute Intoxication/Withdrawal - Current Risk Factor:  0    Reporting sober date of 9/3/18    Goals: Develop effective strategies to maintain sobriety.     Data: 12/3/18: Client denied nor demonstrated any signs/symptomology of intoxication and/or withdrawal. Client confirmed his last use date was on 9/2/18. UA completed 11/6/2018 NEG all substances. UA completed 12/4/18 NEG all substances.     DIMENSION 2:  Biomedical Conditions and Complaints  The degree to which any physical disorder would interfere with treatment for substance abuse and the client's ability to tolerate any related discomfort     Biomedical Conditions and Complaints - Current Risk Factor:  0    Goals: Disclose CD status to medical providers and follow up with medical interventions while in EOP.     Data: 12/3/18:   Client reports attending the following doctor s appointments over the past week: None. Client reports the following changes to his physical health over the past week: \"None\". Client remains capable of autonomous healthcare.     Current Outpatient Prescriptions   Medication     nicotine (NICODERM CQ) 21 MG/24HR 24 hr patch     nicotine polacrilex (COMMIT) 2 MG lozenge     nicotine polacrilex (NICORETTE) 2 MG gum     omeprazole (PRILOSEC) 20 MG CR capsule     No current facility-administered medications for this encounter.      Facility-Administered Medications Ordered in Other Encounters   Medication     Self Administer Medications: Behavioral Services       DIMENSION 3:  Emotional/Behavioral/Cognitive Conditions and Complications  The degree to which any condition or complications are likely to interfere with treatment for substance abuse or with function in significant life areas and the likelihood of risk of harm to self or others.     Emotional/Behavioral - Current Risk Factor:  1    DSM-5 Diagnoses:  Unclear- hx of bipolar diagnosis, but reports he suspects he has depression. Mental health " "diagnoses may have been confounded by substance use. DA may be warranted after client has sufficient sobriety.     Goals: Client identified a goal to learn how to identify emotions and express them in healthy ways, as well as practice \"recognizing what's underneath\". Client identified a goal to find a sense of purpose, and better recognize \"strengths vs weaknesses.\" Learn how to apply self-care and psychotherapy to build a repertoire of daily habits that counteract PAWS, fatigue, depression, anxiety and increase resiliency and well-being.      Data: 12/3/18: Client denied suicidal ideation or self-injurious behavior. Client reported no mental health symptoms this week.     DIMENSION 4:  Readiness to Change  Consider the amount of support and encouragement necessary to keep the client involved in treatment.     Readiness to Change - Current Risk Factor:  1    Goals: Client identified a goal to lift his self-esteem.    Data: 12/3/18: Client reported he moved his life forward this week by: \"just totally understanding I can only control myself\". 12/6/18: Client participated in a psycho-education lesson on radical acceptance. Client reported \"it makes sense\" and he found it helpful. Client reported he identified with the concept of \"staying stuck in suffering\" by using drugs, and the fact that painful emotions remain unprocessed when we use substances. Client reported he wants to practice radically accepting that \"I'm gonna have cravings, some days they're going to be real tough\". Client reported he is working on radically accepting that some around him can't or won't understand his priorities in recovery, and the boundaries he will need to set to protect his recovery.    Intervention: Client was given group time to process his efforts using radical acceptance. Counselor utilized cognitive behavioral therapy, motivational interviewing techniques. Counselor utilized validation, thought re-framing, self-esteem building and " "strengths-building techniques. Client participated in a psycho-education group on radical acceptance wherein he learned the four choices to each problem, the concept of radical acceptance, when to apply/when not to apply radical acceptance, and identified something in his life he needed to radically accept. Client was given the homework to practice shifting his mind toward radical acceptance for the issue of lack of understanding from family regarding his boundaries/needs/priorities in recovery.     Assessment: Client appears to be in the Stages of Change Model  Action within the stages of change model. Client appeared to understand the concept of radical acceptance. Client appears motivated to practice acceptance of what is out of his control, particularly other people and the past, and would benefit from continued coaching and guidance in this area.     Plan: -Continue client in Phase 2  -Continue assessing client for burnout/being overwhelmed with new 40 hour/week schedule plus treatment  -Meet individually 12/10/18, review treatment plan, discuss schedule     DIMENSION 5:  Relapse/Continued Use/Continued Problem Potential  Consider the degree to which the client recognizes relapse issues and has the skills to prevent relapse of either substance use or mental health problems.     Relapse/Continued Use/Continued Problem Potential - Current Risk Factor:  3    Goals: Increase insight into motivations and consequences for using, develop alternative methods to address these areas. Identify and address relapse triggers and cues.    Data: 12/3/18: Client rated his cravings this week on a scale of 1-10 (1=none, 10= \"I used\"): 2. Client identified the following triggers that contributed to his cravings: \"random thoughts/people\". Client reported using the following coping skills helped minimize his cravings this past week: \"stay away from them/ breath.\"  12/6/2018: Client reported he experienced significant cravings at his " "3 month kd this week. Client reported at the core of the cravings is \"fear\" that \"a lot of stuff is going to repeat itself\" with his girlfriend. Client reported he sees some things are repeating such as Norma minimizing his boundaries with substance use, or pushing him to put his recovery aside to focus on work.     DIMENSION 6:  Recovery Environment  Consider the degree to which key areas of the client's life are supportive of or antagonistic to treatment participation and recovery.     Recovery Environment - Current Risk Factor:  2    Goals: Implement and/or improve application of coping skills to avoid relapse. Client identified a goal to improve sober hobbies and interests.    Data: 12/3/18: Client rated their living Situation on a scale of 1-10 (1=completely antagonistic 10=totally supportive): 5. Client reports that this is due to: \"because they are insecure\". Client reports during the past week that he built their sober support network by \"I think my temporary sponsor will be permanent\". Client reported a plan to build their sober support network this week by \"[facility] [place], and find meetings on days off or not in group\". Client reported volunteered at [employer] over the weekend, and spent his free time going to the gym and watching TV. Client reported he met with his temporary sponsor, Darius, on 11/30/18 and it was a very good meeting. Client reported he hopes to make Darius his permanent sponsor. Client reported he had a \"blowout\" with his daughter due to his daughter's dishonesty regarding risky behaviors related to her boyfriend, and client reported he set a boundary with her that she can't talk with him about her boyfriend due to client's concerns about the boyfriend and client's daughter choosing to not heed his concerns. Client reported she has others to talk to about the issue, but for his sobriety and to avoid making poor decisions he needs to keep this boundary with her.     Intervention: " Counselor validated client's decision to set a boundary with his daughter. Counselor discussed the pros and cons to setting this boundary, particularly the drawback that client's daughter may feel more isolated if the situation becomes abusive and she feels she can't reach out. Counselor reinforced client's sober support-building efforts including volunteering, engaging with temporary sponsor, and using [facility] [employer] [facility].     Assessment: Client appears to be setting a healthy boundary with his daughter, and is seeking support and feedback in this area. Client appears to be building sober social supports outside of his family and treatment. Dim 6 risk rating reduced from 3 to 2 as a result.     Plan: -Continue client in Phase 2  -Continue encouraging and assisting client in building sober supports in the community         NELSON Ledesma [facility]

## 2018-12-06 ENCOUNTER — HOSPITAL ENCOUNTER (OUTPATIENT)
Dept: BEHAVIORAL HEALTH | Facility: CLINIC | Age: 43
End: 2018-12-06
Attending: SOCIAL WORKER
Payer: COMMERCIAL

## 2018-12-06 PROCEDURE — H2035 A/D TX PROGRAM, PER HOUR: HCPCS | Mod: HQ

## 2018-12-10 ENCOUNTER — HOSPITAL ENCOUNTER (OUTPATIENT)
Dept: BEHAVIORAL HEALTH | Facility: CLINIC | Age: 43
End: 2018-12-10
Attending: SOCIAL WORKER
Payer: COMMERCIAL

## 2018-12-10 ENCOUNTER — TELEPHONE (OUTPATIENT)
Dept: BEHAVIORAL HEALTH | Facility: CLINIC | Age: 43
End: 2018-12-10

## 2018-12-10 PROCEDURE — H2035 A/D TX PROGRAM, PER HOUR: HCPCS | Mod: HQ

## 2018-12-10 PROCEDURE — H2035 A/D TX PROGRAM, PER HOUR: HCPCS

## 2018-12-10 NOTE — TELEPHONE ENCOUNTER
"12/10/2018     Data: Received VM from client's SO, Norma. Norma reported concern and was seeking advice, and requested call back at [phone]. Counselor returned call. Norma reported she was seeking advice about \"what I'm doing wrong\" and how she can better support client as she has noticed increasing concerning behaviors. Norma reported client has been struggling since Thanksgiving due to group members relapsing. Norma reported the last two two days, he's been in a \"very familiar place\" that scares her, reports it is reminiscent of his mental status prior to past use episodes. Norma reported he is verbally \"lashing\" her, reports he accuses her of trying to \"control\" him because of their arrangement that she manages finances. Norma also reported concern as client wants to have his own car, and has often relapsed shortly after getting access to a car. Norma reported he has made comments and \"accusations\" towards her, and is \"yelling at me constantly\". Norma reported she struggles to be intimate with him \"when he's being so mean and terrible to me\", which she feels may be adding to his agitation. Norma reported she thinks he is going to relapse soon. Norma reported she does not currently engage with al-anon or concerned persons groups, but would be willing to look into them as resources. Norma reported she and client do not see a couples therapist currently, but reported she would be willing to do this if it will help client.     Intervention: Counselor validated Norma's concerns. Counselor cautioned Norma not to assume that she can control the outcome of client's recovery by doing or not doing something, rather that she can be supportive or unsupportive. Counselor encouraged Norma to consider engaging with support groups for support. Counselor offered options of scheduling a couple's session here at [place], or referrals to couples therapists in the community.     Assessment: Client appears to have " immediate family members who are concerned about changes in behavior and/or return to concerning behavior. Client appears to have family members that want to support him, but may struggle at times to know how best to support him.     Plan: Individual session with client scheduled for 12/10/2018 at 4:30 PM. Counselor will assess whether client feels he is having issues at home, get his perception of the situation. Refer from there.     NELSON Ledesma [facility]

## 2018-12-11 ENCOUNTER — HOSPITAL ENCOUNTER (OUTPATIENT)
Dept: BEHAVIORAL HEALTH | Facility: CLINIC | Age: 43
End: 2018-12-11
Attending: SOCIAL WORKER
Payer: COMMERCIAL

## 2018-12-11 PROCEDURE — H2035 A/D TX PROGRAM, PER HOUR: HCPCS | Mod: HQ

## 2018-12-11 PROCEDURE — H2035 A/D TX PROGRAM, PER HOUR: HCPCS

## 2018-12-11 NOTE — PROGRESS NOTES
"Individual Session Summary    Data: Met with client for 1 hour from 4:15 PM to 5:15 PM 12/10/2018. Client reported feeling frustrated and wound up as a result of his family. Client reported he had asked they bring him a change of clothes since he was going straight from work to treatment and they \"didn't care\" enough to bring it. Client reported, upon prompting from counselor, that he may be personalizing this incident. Client reported he has been \"judgmental\" and \"anal\" lately as he feels his family is not taking his recovery \"seriously\". Client reported on Saturday, his significant other asked him to go to the [employer] store for her and her friends, which client refused to do. Client reported that also on Saturday, an old friend messaged him and informed him his significant other had a dating site account. Client reported he confronted his significant other, and she \"brushed it off\". Client reported feeling \"I don't know what to do\". Client reported he doesn't want to make any \"snap decisions\" such as leaving the relationship, but reported the ordeal over the past couple days has triggered \"intense thoughts\" about using. Client reported he has avoided using by reminding himself \"I know how that's going to end\". Client reported his emotions over the past two days are \"irritable\", and \"wanting to leave\". Client reported since [place] morning, he has been \"very snappy\" at others. Client reported he is withdrawing form his significant other, and doesn't want to be intimate with her due to feeling vulnerable, unsupported, and betrayed. Client reported feeling \"I feel real vulnerable right now\". Client reported he knows using won't help, and that he will be left dealing with the fallout of that choice, regardless of who/what triggered his cravings. Client reported he feels safe for the next 24 hours.     Intervention: Counselor validated client's emotional experience. Counselor guided client to identify where his " "interpretations may be affecting his emotions, and vice versa. Counselor reviewed emotions model with client, guided client to consider what his current emotions are triggering him to do. Counselor reviewed the opposite action skill, encouraged client to practice for the next 24 hours the \"opposite\" of what his anger is triggering him to do, i.e. be a little kind, gently avoid, etc. Counselor reviewed the next 24 hours with client to assess for safety. Counselor shared observation that client and his significant other do not appear to be working as a team. Counselor encouraged client's efforts to not make any snap decisions, but shared speculation that if client and significant other choose to continue working together, that couples therapy may be warranted. Counselor provided client with information on Celebrate Recovery, SMART Recovery.     Assessment: Client appears to be withdrawing emotionally from those around him, which aligns with concerned person report received 12/10/18. Client appears to be aware of his past impulsive behavior and appears to be making efforts to avoid making an impulsive decision regarding his relationship. Client appears to be at a moderately increased risk of relapse at this time.     Plan: -Meet again individually 12/17/18 at 4:30 PM  -Client will meet with MARLA Estevez 12/11/2018 at 4:30 PM  -Client and counselor will discuss next steps on 12/11/2018, increase group hours if needed  -Client will review Celebrate Recovery and SMART Recovery resources.       Diana Steward, [facility]    "

## 2018-12-12 NOTE — PROGRESS NOTES
"INDIVIDUAL SESSION SUMMARY    D) Met with client on 12/11/18 from 4:30-5:15pm. Client reported several incidents with his GF including her offering him alcohol and asking him to go to the liquor store, and how he felt \"I can't trust her with my recovery\". Client spoke of pattern with his GF where he feels he's setting healthy boundaries and his GF is telling him that he's being \"unreasonable\" or \"acting negative\". Client spoke of feeling like his GF is \"trying to trip him up\". Therapist and client discussed how his GF is not used to the \"sober\" him and is going through a transition herself; where she may not be intentionally trying to sabotage his recovery but her actions could be perceived that way by the client. Client stated that he's even more \"deligent\" with his recovery and has found online support through the \"[facility]\" [place] and wants to increase his meeting attendance. Client reported his GF criticizing his need to attend meeting and how he doesn't want to be in the position of needing to depend on her for rides; that he is pursuing options to get himself a car. Client reported increased distrust with his GF when he learned she had an online dating profile; that she reported she closed the profile. The client spoke of feeling \"alone\" in his recovery and not knowing how to connect with his GF. Client and therapist discussed medication options to help with his increased cravings and client stated that he is not interested; that he will use his sober support system instead.     I) Individual session with client. Provided client with verbal interventions including: validation, nurturing, compassion and support. Therapist role-played a conversation the client could have with his GF to describe his feeling disconnection from her and inviting her to participate more in his recovery. Therapist  offered some couples therapy referrals that are located close to the client's home.     A) Client appears to be using his " support system to manage increased stress and negative affect at home. Client appears to be rebuilding trust with himself, his GF and his children. Client appears to have more awareness of his tendency to react impulsively and is running the tape in order to break old patterns. Client appears to be using new skills including reading, meetings and exercise for emotional regulation and stress management.     P) Next session is scheduled for 12/18/18.   MARLA Tamayo  12/12/2018

## 2018-12-12 NOTE — PROGRESS NOTES
CD ADULT Progress Note     Treatment Plan Review completed on: 12/13/18     Attendance Dates: 12/10/18 12/11/18 12/13/18 12/14/18     Total # of P1 Group Sessions: 19  Total # of P2 Group Sessions: 11  Total # of P3 Group Sessions: NA  Total # of 1:1 Sessions: 11 (including individual sessions with MARLA Estevez    Projected discharge date: 2/12/19 MONDAY TUESDAY WEDNESDAY THURSDAY FRIDAY SATURDAY SUNDAY Total   Group Therapy 2   2 3   7   Specialty Groups*  2      9   1:1 1 1 (Melissa)      2   Family Program           [place]             Phase II             Absent           Total 3 3  2 3   11     12/13/18: Increase in hours this week due to client's increased risk of relapse due to Dim 6 concerns.  *Specialty Groups include Mental Health Care, Assertiveness and Communication, Sobriety Maintenance Skills, Spiritual Care, Stress Management, Relapse Prevention, Family Systems.                    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 Use Disorder Moderate  304.20 (F14.20) Cocaine Use Disorder Severe  305.10 (F17.200)  Tobacco Use Disorder Moderate     1) Care Coordination Activities: Reviewed case with MARLA Estevez  2) Medical, Mental Health and other appointments the client attended: Individual with Melissa 12/4/18   3) Medication issues: None reported  4) Physical and mental health problems: None reported  5) Review and evaluation of the individual abuse prevention plan: NA     ASA Risk Ratings and Data  "      DIMENSION 1: Acute Intoxication/Withdrawal  The client's ability to cope with withdrawal symptoms and current state of intoxication       Acute Intoxication/Withdrawal - Current Risk Factor:  0    Reporting sober date of 9/3/18    Goals: Develop effective strategies to maintain sobriety.     Data: 12/10/18: Client denied nor demonstrated any signs/symptomology of intoxication and/or withdrawal. Client confirmed his last use date was on 9/2/18. UA completed 11/6/2018 NEG all substances. UA completed 12/4/18 NEG all substances. UA completed 12/10/18 NEG all substances.     DIMENSION 2:  Biomedical Conditions and Complaints  The degree to which any physical disorder would interfere with treatment for substance abuse and the client's ability to tolerate any related discomfort     Biomedical Conditions and Complaints - Current Risk Factor:  0    Goals: Disclose CD status to medical providers and follow up with medical interventions while in EOP.     Data: 12/10/18: Client reports attending the following doctor s appointments over the past week: None. Client reports the following changes to his physical health over the past week: \"None\". Client remains capable of autonomous healthcare.     Current Outpatient Medications   Medication     nicotine (NICODERM CQ) 21 MG/24HR 24 hr patch     nicotine polacrilex (COMMIT) 2 MG lozenge     nicotine polacrilex (NICORETTE) 2 MG gum     omeprazole (PRILOSEC) 20 MG CR capsule     No current facility-administered medications for this encounter.      Facility-Administered Medications Ordered in Other Encounters   Medication     Self Administer Medications: Behavioral Services       DIMENSION 3:  Emotional/Behavioral/Cognitive Conditions and Complications  The degree to which any condition or complications are likely to interfere with treatment for substance abuse or with function in significant life areas and the likelihood of risk of harm to self or others. " "    Emotional/Behavioral - Current Risk Factor:  1    DSM-5 Diagnoses:  Unclear- hx of bipolar diagnosis, but reports he suspects he has depression. Mental health diagnoses may have been confounded by substance use. DA may be warranted after client has sufficient sobriety.     Goals: Client identified a goal to learn how to identify emotions and express them in healthy ways, as well as practice \"recognizing what's underneath\". Client identified a goal to find a sense of purpose, and better recognize \"strengths vs weaknesses.\" Learn how to apply self-care and psychotherapy to build a repertoire of daily habits that counteract PAWS, fatigue, depression, anxiety and increase resiliency and well-being.      Data: 12/10/18: Client denied suicidal ideation or self-injurious behavior. Client reported MH symptom of irritability at a 7 on a scale of 1-10. Client reported using coping skills of \"4-7-8 breathing\". Client reported he would like to continue practicing \"opposite action\". Client reported a mental health goal of the week is \"relax\".     DIMENSION 4:  Readiness to Change  Consider the amount of support and encouragement necessary to keep the client involved in treatment.     Readiness to Change - Current Risk Factor:  1    Goals: Client identified a goal to lift his self-esteem.    Data: 12/10/18: Client reported he moved his life forward this week by: \"being honest about how I'm feeling.\"    DIMENSION 5:  Relapse/Continued Use/Continued Problem Potential  Consider the degree to which the client recognizes relapse issues and has the skills to prevent relapse of either substance use or mental health problems.     Relapse/Continued Use/Continued Problem Potential - Current Risk Factor:  3    Goals: Increase insight into motivations and consequences for using, develop alternative methods to address these areas. Identify and address relapse triggers and cues.    Data: 12/10/18: Client rated his cravings this week on a " "scale of 1-10 (1=none, 10= \"I used\"): 8. Client identified the following triggers that contributed to his cravings: being asked to go to the liquor store, finding out his significant other had a dating profile, feeling irritable, vulnerable. Client reported using the following coping skills helped minimize his cravings this past week: \"breathing.\"     Intervention: Client was given group time to process his triggers and ravings. Counselor utilized cognitive behavioral therapy, motivational interviewing techniques. Counselor utilized validation, thought re-framing, self-esteem building and strengths-building techniques. Client and counselor created plan to address his relapse risk. Counselor assisted client in planning out 24 hours from Monday 12/10/18 to Tuesday 12/11/18, then 48 hours from 12/11/18 to 12/13/18.     Assessment: Client appears to be in the Stages of Change Model  Action within the stages of change model. Client appears to be at a moderately increased risk of relapse this week due to triggers mainly in Dim 6. Client appears to be seeking help and support from appropriate resources. Client appears to be managing his cravings appropriately.     Plan: -Client to attend 9 hours of group therapy this week.   -Client and counselor will re-assess week of 12/17 whether client is ready to return to 6 hrs/week.   -Client and counselor to meet individually 12/17/18.        DIMENSION 6:  Recovery Environment  Consider the degree to which key areas of the client's life are supportive of or antagonistic to treatment participation and recovery.     Recovery Environment - Current Risk Factor:  3    Goals: Implement and/or improve application of coping skills to avoid relapse. Client identified a goal to improve sober hobbies and interests.    Data: 12/10/18: Client rated their living Situation on a scale of 1-10 (1=completely antagonistic 10=totally supportive): 6. Client reports during the past week that he built " "their sober support network by \"talking on Sober Grid\". Client reported a plan to build their sober support network this week by \"talk on Sober grid more\". Client reported on 12/8/18 his girlfriend asked that he  liquor at the store for her and her friends, which client reported he refused to do. Client reported that same day, a friend informed him his girlfriend had a dating profile. Client reported he confronted his girlfriend and she minimized its importance. Client reported feeling frustrated, angry, betrayed, and feeling an urge to make \"snap decision\", particularly to leave the relationship. 12/11/18: Client reported learning about early child development, attachment theory, mirror neurons, and the importance of connection for mental health were all helpful. Client reported it can be \"triggering\" to learn about these topics because his childhood was traumatic, but client reported he recognizes that he has power to change things such as his attachment style, social skills, and make efforts to address the challenges that have occurred as a result of his childhood. 12/13/18: Client reported his main concern with the holidays is \"friendsmas\" where he and his friends participate in a secret Lisa. Client reported not all of his friends are sober, some drink alcohol and some use marijuana. Client reported an intent to drive himself to the party so he can leave after a short period of time. Client reported his children will also be supports.     Intervention:  Client had group time to process his experience with his girlfriend's dating account, and heard feedback from his group members regarding the issue. Majority of processing on this issue between client and counselor was in individual session- see individual session summary. Client participated in a skills group on how family history affects recovery, and the importance of connection wherein he learned the biopsychosocial mechanisms our family affects our " "addiction and recovery, mirror neurons, attachment theory, and the importance of human connection for mental health. Client watched a video on the \"rat park\" experiment and participated in discussions regarding the aforementioned topics. Client was given the homework to complete the assignment \"Understanding Family History\" to reflect on how his family history has influenced him today. Counselor emphasized the importance of practicing radical acceptance for what we can change (such as genetics and childhood experiences), and asserting autonomy over the things we can influence such as our attachment to others, practicing healthy relationship patterns, and building more social connections in recovery. 12/13/2018: Client completed a holiday crisis plan.     Assessment: Client appeared to understand the ways in which family history affects risk/protective factors of addiction and recovery. Client appeared to understand the importance of connection in recovery. Client appears motivated to improve his recovery environment at this time. Dim 6 risk rating worsened to a 3 due to client's current reports that his family and significant other are unsupportive. It appears that client and his significant other would benefit from more intensive interventions such as couples therapy to improve client's recovery environment.     Plan: -Client will review couples therapy referrals provided by MARLA Estevez  -Client will review information on Celebrate Recovery and SMART Recovery provided by counselor  -Client will continue in Phase 2  -Counselor and client will gauge on 12/17 whether client can return to 6 hours/week  -Client will follow holiday crisis plan over holiday period.          Diana Steward [facility]   "

## 2018-12-13 ENCOUNTER — TELEPHONE (OUTPATIENT)
Dept: BEHAVIORAL HEALTH | Facility: CLINIC | Age: 43
End: 2018-12-13

## 2018-12-13 ENCOUNTER — HOSPITAL ENCOUNTER (OUTPATIENT)
Dept: BEHAVIORAL HEALTH | Facility: CLINIC | Age: 43
End: 2018-12-13
Attending: SOCIAL WORKER
Payer: COMMERCIAL

## 2018-12-13 PROCEDURE — H2035 A/D TX PROGRAM, PER HOUR: HCPCS | Mod: HQ

## 2018-12-13 NOTE — TELEPHONE ENCOUNTER
Norma called to speak with this therapist about some interactions at home with the client and asked for support/guidance on how to approach the client. This therapist emailed Norma couples therapy referrals in the [place] area and encouraged attendance in the Tuesday evening Concerned Persons group at [place].   MARLA Tamayo

## 2018-12-14 ENCOUNTER — HOSPITAL ENCOUNTER (OUTPATIENT)
Dept: BEHAVIORAL HEALTH | Facility: CLINIC | Age: 43
End: 2018-12-14
Attending: SOCIAL WORKER
Payer: COMMERCIAL

## 2018-12-14 PROCEDURE — H2035 A/D TX PROGRAM, PER HOUR: HCPCS | Mod: HQ

## 2018-12-15 NOTE — PROGRESS NOTES
"Eric VALENCIA [facility]  2018  [id number]    [facility] Mental Health Process Group Note      Day and Date:  Friday, 18     Number of participants:  6     Length of Group: 3 hours    Goal of the Group: Clients learn key concepts of traditional CBT (cognitive distortions, conditioning, automatic thoughts, reframing) and then build on these by learning three core concepts of third wave therapies (ACT, DBT, MB): Self-as-Context, Values and Committed Action. The objective is to increase psychological flexibility and choose behaviors that serve the clients  personal values.        Rating scales are 1-10, with 1 being low and 10 being high or most severe.     [place]: Group Topics and Activities     I.  D3 Intervention and Group Topic addressed: Mental Health Part 2    II. Mindfulness and/or Motivational Component:  Values and Committed Action, Psychological Flexibility, Excerpts from documentary  I AM  to clarify values and emphasize the importance of community and connection to others.      III. Additional Activity:  Completed examples of  auto-  behavior vs. values based actions, completed the AAQ-II scale; identified personal value to work on this week +  create a  goal that reflects this value + commit to one step toward that goal for this week.       IV. Review of Progress:   A. Client's self-report:  Eric reported that \"everything's been cool\" and he working 10 hr days Mon - Thurs and has 3 day weekends. He said is also picking up some overtime hours for [employer] and his family will be taken care of this holiday. He also said he didn't like Radha because his grandmother who he was very close to, was born in December and also  in December so this season reminds him of his loss.  He has continue to do his journal in P2 (he came to make this group up today) and also does mindfulness during the week,.  His stress is a 2 and he had some cravings this weekend that made his stress an 8, but now " "that's been resolved and his stress is back to a 2.  He shared that he has a \"sober grid [place]\" and one of the craving responses is, \"It would be nice, but it's not necessary.\"     B. Therapist's Assessment:  Eric participates very well in group setting and uses humor to make his points.  The value Eric chose to work on this week is \"Wil[place] for all\"  and the goal that reflects this value is \"look for similarities, not differences in people.\"  The step toward this goal for this week is \"Be approachable at work.\"      V.   Reflection and evaluation of today s group experience:  Gave verbal feedback and filled out evaluation form. The most important thing learned today was     VI. Assignments or activities to do for next week: Continue to do the journal and mindfulness, practice cognitive defusion, recognize the \"observing self\" and notice what values are reflected in daily choices and decisions.  Follow through on your STEP 1 from today s value s work.     Therapeutic techniques in this session:  Motivational Therapy, CBT/DBT/ACT, Supportive, Behavioral Modification and Mindfulness     Additional Treatment Referrals/Follow-up Issues to Address: Not indicated at this time.      Change in Treatment Plan: Not indicated at this time. This session completes one Tx Plan intervention under D3.       Change in Diagnosis: Not at this time.        "

## 2018-12-17 ENCOUNTER — HOSPITAL ENCOUNTER (OUTPATIENT)
Dept: BEHAVIORAL HEALTH | Facility: CLINIC | Age: 43
End: 2018-12-17
Attending: SOCIAL WORKER
Payer: COMMERCIAL

## 2018-12-17 PROCEDURE — H2035 A/D TX PROGRAM, PER HOUR: HCPCS

## 2018-12-17 PROCEDURE — H2035 A/D TX PROGRAM, PER HOUR: HCPCS | Mod: HQ

## 2018-12-18 ENCOUNTER — HOSPITAL ENCOUNTER (OUTPATIENT)
Dept: BEHAVIORAL HEALTH | Facility: CLINIC | Age: 43
End: 2018-12-18
Attending: SOCIAL WORKER
Payer: COMMERCIAL

## 2018-12-18 PROCEDURE — H2035 A/D TX PROGRAM, PER HOUR: HCPCS

## 2018-12-18 PROCEDURE — H2035 A/D TX PROGRAM, PER HOUR: HCPCS | Mod: HQ

## 2018-12-18 NOTE — PROGRESS NOTES
"Individual Session Summary    Data: Met with ct for 45 minutes from 4:30 PM to 5:15 PM. Client reported he went to [place] with his girlfriend and it was a good trip. Client reported he is not going to do overtime this week and will instead focus on rest and and his recovery. Client reported he got his gym membership this past week. Client reported he feels better this week about returning to 3 days/week (Monday, Tuesday, Thursday). Client reported he thinks he can try a new support group in the next week. Client reported he notices his self-esteem transfusion schedule scores lowered since the last time he completed this exercise. Client reported he is \"not surprised\". Client reported \"I could feel\" that his self-esteem had gone back down. Client reported he attributes this to not having as much time to \"reflect\". Client reported long-term, he feels he needs to address his self-esteem with: daily pep talk, affirmations, and \"lift myself\". Client reported he does believe his thought patterns may be an issue, and acknowledges his perceptions at times can be overly negative. Client reported he plans to take breaks at work by himself going forward to have more time for reflection. Client reported he is going to \"ease up\" on overtime and set more boundaries with work. Client reported with his distorted perceptions, he sometimes tells himself to \"shut the f*ck\" up, then distracts himself. Client reported he wants to work on catching his perceptions more. Client reported he wants to be doing more gratitude journaling more. Client reported reviewing events from the day with a \"I saw/heard\" \"I felt\" and \"I thought\" structure may be helpful.    Intervention: Counselor reviewed client's goals from the past week, reinforced his follow-through on said goals. Counselor normalized client's return to lower self-esteem, emphasized the importance of consistent long-term changes in order to have long-term results. Counselor " reviewed with client ways to address his self-esteem. Counselor reviewed with client the relationship between events-perceptions-emotions-action urges-actions (emotions model), and how to address our perceptions. Counselor reviewed with client ways to begin building more sober social connections in the community.    Assessment:  Client appears to continue to be goal-oriented and has good follow-through on his goals and plans. Client appears to have good insight into his self-esteem, and mild insight into the relationship between distorted perceptions and their relationship to his overall mood and self-esteem. Client would benefit from further coaching in this area.     Plan: -Client will practice taking breaks alone at work  -Client will practice more daily pep talks, affirmations  -Client will restart his gratitude exercise when journaling  -Client will journal about events of the day with a structure of: defining the event, defining his emotions, and defining his thoughts, to better understand the relationship between the 3, and identify when his thoughts and/or emotions may not be in line with the event.       Diana Steward, [facility]

## 2018-12-19 NOTE — PROGRESS NOTES
"INDIVIDUAL SESSION SUMMARY    D) Met with client on 12/18/19 from 4:30-5:30. Client reported that the the negative affect between him and his GF had disappeared; that he has been working to control his \"negative reactions\" such as \"mumbling under his breath\" and \"huffing and puffing\"; and that he's noticed his GF has been communicating more with him. Client and therapist discussed the GF's call to this therapist last week. Client stated that he felt like their relationship has improved. Client reported that for self-care he joined the [employer] and committed to attending one recovery meeting this week outside of his IOP. Client reported that he decided to buy his GF's truck and \"fix it up\" so he has a vehicle to drive to work and meetings; that he and his GF have been communicating better about money and have a plan on how to pay off bills and help him fix up the truck. Client spoke with excitement about going to [place] last weekend with his family to see the holiday lights. Client discussed how he's keeping himself safe at work; having identified the people who use drugs and drink and taking his breaks at alternate times.     I) Individual session with client. Provided client with verbal interventions including: validation, nurturing, compassion and support. Discussed the benefits of working on emotional regulation and communicating with others after he has had time to calm down.     A)Client apears to be rebuilding trust with himself, his GF and his children. Client appears to have more awareness of his tendency to react impulsively and is running the tape in order to break old patterns. Client appears to be using new skills including reading, meetings and exercise for emotional regulation and stress management.     P) Next session is scheduled for 1/3/18.  Melissa Thomas, MARLA  12/19/2018    "

## 2018-12-20 ENCOUNTER — HOSPITAL ENCOUNTER (OUTPATIENT)
Dept: BEHAVIORAL HEALTH | Facility: CLINIC | Age: 43
End: 2018-12-20
Attending: SOCIAL WORKER
Payer: COMMERCIAL

## 2018-12-20 PROCEDURE — H2035 A/D TX PROGRAM, PER HOUR: HCPCS | Mod: HQ

## 2018-12-20 NOTE — TELEPHONE ENCOUNTER
"12/20/2018    Data: Received call from client's SO, Norma. 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 Norma reports this is a trigger for client. Norma reported she offered to pick client up from treatment (knowing that money is a trigger for him) and reported client got angry with her. Norma reported client is \"lashing out\" at her and making up reasons for why she can't pick him up. Norma reported he has been texting her angry/hurtful messages. Norma reported she is concerned that in his emotional state and with the cash, he may not take the cab home and instead will go and relapse. Norma reported she is aware that they have been given family therapy referrals but that she hasn't seen them yet as she has been home sick.     Intervention: Counselor validated client's concerns. Counselor reiterated the positives that client has shown, including not hiding the fact that he will be getting cash from group members, and earlier in the week he drove himself to and from work. Counselor assured Norma that counselor will assess client for safety/cravings as is typical, and make adjustments to plan for the group this evening as necessary. Counselor reviewed with Norma previous recommendation of family therapy, encouraged Norma to either get paper copies from client or review them in her email when she returns to work. Counselor encouraged Norma to consider setting up a goal such as 1-2 weeks that she and client will have called a family therapist to set up an appointment.     Assessment: Client and SO appear to be having conflict related to SO's attempts to mitigate client's risk of relapse. It is unclear at this time if client is having an inappropriate emotional response as counselor has not yet seen/talked with client. Regardless, based on collateral, client appears to be reacting with anger/rejection when his family " demonstrate lack of trust in him or concern for his sobriety. Client and SO would benefit from further coaching/support such as couples therapy.    Plan: -Assess client's safety/cravings level/risk for impulsivity  -Continue following up with client, encouraging follow-through with couples therapy referrals.     Diana Steward, [facility]

## 2018-12-20 NOTE — PROGRESS NOTES
CD ADULT Progress Note     Treatment Plan Review completed on: 12/21/18     Attendance Dates: 12/17/18, 12/18/18 12/20/2018     Total # of P1 Group Sessions: 19  Total # of P2 Group Sessions: 14  Total # of P3 Group Sessions: NA  Total # of 1:1 Sessions: 13 (including individual sessions with MARLA Estevez    Projected discharge date: 2/12/19 MONDAY TUESDAY WEDNESDAY THURSDAY FRIDAY SATURDAY SUNDAY Total   Group Therapy 2   2    4   Specialty Groups*  2      2   1:1 1 1 (Melissa)      2   Family Program           [place]             Phase II             Absent           Total 3 3  2    8     *Specialty Groups include Mental Health Care, Assertiveness and Communication, Sobriety Maintenance Skills, Spiritual Care, Stress Management, Relapse Prevention, Family Systems.                    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 Use Disorder Moderate  304.20 (F14.20) Cocaine Use Disorder Severe  305.10 (F17.200)  Tobacco Use Disorder Moderate     1) Care Coordination Activities: Reviewed case with MARLA Estevez  2) Medical, Mental Health and other appointments the client attended: None reported   3) Medication issues: None reported  4) Physical and mental health problems: None reported  5) Review and evaluation of the individual abuse prevention plan: NA     [facility] Risk Ratings and Data       DIMENSION 1: Acute Intoxication/Withdrawal  The client's ability to cope with withdrawal symptoms and current state of  "intoxication       Acute Intoxication/Withdrawal - Current Risk Factor:  0    Reporting sober date of 9/3/18    Goals: Develop effective strategies to maintain sobriety.     Data: 12/17/18: Client denied nor demonstrated any signs/symptomology of intoxication and/or withdrawal. Client confirmed his last use date was on 9/2/18. UA completed 11/6/2018 NEG all substances. UA completed 12/4/18 NEG all substances. UA completed 12/10/18 NEG all substances.     DIMENSION 2:  Biomedical Conditions and Complaints  The degree to which any physical disorder would interfere with treatment for substance abuse and the client's ability to tolerate any related discomfort     Biomedical Conditions and Complaints - Current Risk Factor:  0    Goals: Disclose CD status to medical providers and follow up with medical interventions while in EOP.     Data: 12/17/18: Client reports attending the following doctor s appointments over the past week: None. Client reports the following changes to his physical health over the past week: \"None\". Client remains capable of autonomous healthcare.     Current Outpatient Medications   Medication     nicotine (NICODERM CQ) 21 MG/24HR 24 hr patch     nicotine polacrilex (COMMIT) 2 MG lozenge     nicotine polacrilex (NICORETTE) 2 MG gum     omeprazole (PRILOSEC) 20 MG CR capsule     No current facility-administered medications for this encounter.      Facility-Administered Medications Ordered in Other Encounters   Medication     Self Administer Medications: Behavioral Services       DIMENSION 3:  Emotional/Behavioral/Cognitive Conditions and Complications  The degree to which any condition or complications are likely to interfere with treatment for substance abuse or with function in significant life areas and the likelihood of risk of harm to self or others.     Emotional/Behavioral - Current Risk Factor:  1    DSM-5 Diagnoses:  Unclear- hx of bipolar diagnosis, but reports he suspects he has " "depression. Mental health diagnoses may have been confounded by substance use. DA may be warranted after client has sufficient sobriety.     Goals: Client identified a goal to learn how to identify emotions and express them in healthy ways, as well as practice \"recognizing what's underneath\". Client identified a goal to find a sense of purpose, and better recognize \"strengths vs weaknesses.\" Learn how to apply self-care and psychotherapy to build a repertoire of daily habits that counteract PAWS, fatigue, depression, anxiety and increase resiliency and well-being.      Data: 12/17/18: Client denied suicidal ideation or self-injurious behavior. Client rated the following MH symptoms on a scale of 1-10 (0=did not endorse): sleeping more than usual- 0, fatigue- 0, difficulty concentrating- 0, restlessness- 0, mood swings- 0, irritability- 0, lack of interest or pleasure in activities- 0, hopelessness- 0, muscle tension- 0, shakiness- 0, sadness- 0, worry/rumination- 0, increased energy- 0, grandiosity- 0, sleeping less than usual- 0, appetite changes- 0, intrusive/distressing memories- 3, difficulty falling or staying asleep- 0.  Client reports that he coped with the aforementioned symptomology by \"questioning thoughts.\"     DIMENSION 4:  Readiness to Change  Consider the amount of support and encouragement necessary to keep the client involved in treatment.     Readiness to Change - Current Risk Factor:  1    Goals: Client identified a goal to lift his self-esteem.    Data: 12/17/18: Client reported he moved his life forward this week by: \"radical acceptance.\" 12/18/18: Client reported spirituality is an integral part of his recovery. 12/20/18: Client reported stigma of addiction has kept him from reaching out/being honest with others. Client reported he is now open and honest about his recovery with others, to combat stigma. Client reported he feels accepted by his immediate family and friends.     Intervention: " "Client was given group time to process his week, efforts . Counselor utilized cognitive behavioral therapy, motivational interviewing techniques. Counselor utilized validation, thought re-framing, self-esteem building and strengths-building techniques. Client participated in a skills group on spirituality, was given optional homework gratitude assignment to practice for the next 12 days to explore his spirituality. Client watched the film \"The Anonymous People\", engaged in discussion with peers regarding the stigma of addiction, the anonymous nature of recovery, and advocating for oneself and one's peers in recovery.     Assessment: Client appears to be in the Stages of Change Model  Action within the stages of change model. Client appeared to participate well in the various groups an discussions this week. Client appears to be utilizing spirituality as a tool in recovery. Client endorsed feeling safe and secure in his recovery this week, though behavior of concern occurred 12/20/18 (see Dim 5) that may suggest treatment noncompliance.     Plan: -Continue client in Phase 2  -Check in with client regarding issue (see Dim 5) that occurred on 12/20/18.   -Meet individually 12/27/2018 at 4 PM.     DIMENSION 5:  Relapse/Continued Use/Continued Problem Potential  Consider the degree to which the client recognizes relapse issues and has the skills to prevent relapse of either substance use or mental health problems.     Relapse/Continued Use/Continued Problem Potential - Current Risk Factor:  3    Goals: Increase insight into motivations and consequences for using, develop alternative methods to address these areas. Identify and address relapse triggers and cues.    Data: 12/17/18: Client rated his cravings this week on a scale of 1-10 (1=none, 10= \"I used\"): 1. Client identified the following triggers that contributed to his cravings: \"thoughts\". Client reported using the following coping skills helped minimize his " "cravings this past week: \"exercise.\" 12/20/18: Client reported he was not experiencing cravings and was not triggered by his group members giving him cash to buy a pizza. Client reported he planned to give the money to his girlfriend when he arrived home. Client reported he wanted to take his cab home because he likes reflecting on the day during his drive home. Client reported he felt safe to take his cab home and give the cash to his girlfriend.     Intervention: Counselor checked in with client after concerned collateral call 12/20/18. Counselor inquired about any triggers/cravings which client denied. Counselor reviewed with client his plan for the cash, and plan for traveling home. At 7:50 PM, counselor discovered in lobby that there was a cab waiting, reporting they were waiting for client. Counselor called client twice and his phone went straight to voice mail. Counselor left messages expressing concern due to discovering client's cab downstairs, wondering if he made it home safely. Counselor called collateral and informed her of this development.     Assessment: It is unclear what occurred on 12/20/18, as client verified feeling safe, denied cravings or any plans to use substances, however he appeared to not follow through with his plan to take his cab home.     Plan: -Call client 12/21/18 in attempt to contact  -Call client 12/26/18 upon return from holiday (clinics closed 12/24-12/25).     DIMENSION 6:  Recovery Environment  Consider the degree to which key areas of the client's life are supportive of or antagonistic to treatment participation and recovery.     Recovery Environment - Current Risk Factor:  3    Goals: Implement and/or improve application of coping skills to avoid relapse. Client identified a goal to improve sober hobbies and interests.    Data: 12/17/18: Client rated their living Situation on a scale of 1-10 (1=completely antagonistic 10=totally supportive): 5. Client reports that this is " "due to: \"not too understanding, or not educated enough with addiction\". Client reports during the past week that he built their sober support network by \"Sober grid\". Client reported a plan to build their sober support network this week by \"go to meeting\". Client reported he spent Sunday by himself/engaging in self-care, went to the movies, ate pizza, and went to the gym. Client reported work is going well, however he was feeling \"really undervalued\" after a situation at work. Client reported he suspects his coworker/boss is coming in drunk. Intervention: Counselor encouraged client to practice his assertive communication skills at work, advocate for himself.            Diana Steward, [facility]   "

## 2018-12-27 ENCOUNTER — HOSPITAL ENCOUNTER (OUTPATIENT)
Dept: BEHAVIORAL HEALTH | Facility: CLINIC | Age: 43
End: 2018-12-27
Attending: SOCIAL WORKER
Payer: COMMERCIAL

## 2018-12-27 LAB
AMPHETAMINES UR QL SCN: NEGATIVE
BARBITURATES UR QL: NEGATIVE
BENZODIAZ UR QL: NEGATIVE
CANNABINOIDS UR QL SCN: NEGATIVE
COCAINE UR QL: NEGATIVE
ETHANOL UR QL SCN: NEGATIVE
OPIATES UR QL SCN: NEGATIVE
PCP UR QL SCN: NEGATIVE

## 2018-12-27 PROCEDURE — H2035 A/D TX PROGRAM, PER HOUR: HCPCS

## 2018-12-27 PROCEDURE — 80324 DRUG SCREEN AMPHETAMINES 1/2: CPT | Performed by: FAMILY MEDICINE

## 2018-12-27 PROCEDURE — 80320 DRUG SCREEN QUANTALCOHOLS: CPT | Performed by: FAMILY MEDICINE

## 2018-12-27 PROCEDURE — 80349 CANNABINOIDS NATURAL: CPT | Performed by: FAMILY MEDICINE

## 2018-12-27 PROCEDURE — 80307 DRUG TEST PRSMV CHEM ANLYZR: CPT | Performed by: FAMILY MEDICINE

## 2018-12-27 PROCEDURE — H2035 A/D TX PROGRAM, PER HOUR: HCPCS | Mod: HQ

## 2018-12-27 PROCEDURE — 80359 METHYLENEDIOXYAMPHETAMINES: CPT | Performed by: FAMILY MEDICINE

## 2018-12-28 LAB
CANNABINOIDS UR CFM-MCNC: 9 NG/ML
CARBOXYTHC/CREAT UR: 4 NG/MG{CREAT}
CREAT UR-MCNC: 231 MG/DL

## 2018-12-28 NOTE — PROGRESS NOTES
"Individual Session Summary    Data: Met with client for 1 hour from 4 PM to 5 PM 12/27/18. Client reported on 12/20/18, he left group and instead of taking his cab home as planned, he took the bus to a cousin's house. Client reported he did this because he \"didn't want to go home\". Client reported this cousin \"doesn't use\" and he had \"no intention of using\" at the time. Client reported he was upset with his significant other for \"lying\" about contacting his counselors. Client reported he was not upset with her for contacting counselors. Client reported he slept on his cousin's couch, then the next morning a different cousin came over who does use substances. Client reported he went to the using cousin's house and used the day of 12/21/18. Client reported 12/22/18, he discovered bed bugs on him and left. Client reported he ran into a sober friend who let him stay at his house 12/22/18-12/24/18 and helped him clean up the bed bugs and clean himself up before returning home. Client reported he returned home on 12/24/18 and had a \"really good\" Radha jace with his family and friends. Client reported his significant other was \"upset\" and it was \"deja\" when he returned home but overall, he heard messages of love, support, encouragement from his family and friends. Client reported his kids are talking to him, whereas in the past they would not talk to him for \"at least a month\" after a use episode. Client reported they acted \"as if I had never left\". Client reported the desire to not return home and \"deal\" with his significant other was a significant reason for why he chose to go to his cousin's. Client reported his thoughts on that are that they \"need\" couples therapy if they are going to continue being together. Client reported the 4 days away from his family were \"very annoying\". Client reported he did not spend his own money to use. Client reported the money he received for pizza on 12/20/18 was not a trigger. " Client reported he typically is gone for a month or longer when he uses. Client reported he also typically spends his money and/or steals some things from the home. Client reported he also has never returned to treatment after experiencing a use episode due to shame.     Intervention: Counselor reviewed timeline of client's use episode. Counselor explored with client triggers and cues that he was going to relapse. Counselor reinforced the positive choices client made, including accepting help from a sober friend, returning home as soon as he could, returning to outpatient and being honest. Counselor reinforced client's assessment that addressing the relational concerns at home will be essential for continued sobriety.     Assessment: Client appeared to experience a use episode brought on by stress and relational concerns at home. Client appears to continue to struggle with the maladaptive coping mechanism of avoiding conflict/ avoiding others when frustrated or angry. Client would benefit from further coaching and redirection in this area. Client does not appear to require a higher level of care at this time, but would likely benefit from following through with referrals to couples therapy and increasing support in the community.     Plan: -Meet individually week of 12/31, update treatment plan  -Client will review couples therapy referrals with his significant other  -Client and significant other will meet for a family meeting either with MARLA Estevez or Conner Alford  -Client will attend group 3 days/week until he and counselor feel he is ready to drop down to 2 days/week  -Client will begin attending sober support groups in the community (current barrier is transportation, client and counselor will continue to work on this issue).       Diana Steward, [facility]

## 2018-12-28 NOTE — PROGRESS NOTES
CD ADULT Progress Note     Treatment Plan Review completed on: 12/28/2018     Attendance Dates: 12/27/18     Total # of P1 Group Sessions: 19  Total # of P2 Group Sessions: 15  Total # of P3 Group Sessions: NA  Total # of 1:1 Sessions: 13 (including individual sessions with MARLA Estevez    Projected discharge date: 2/12/19 MONDAY TUESDAY WEDNESDAY THURSDAY FRIDAY SATURDAY SUNDAY Total   Group Therapy    2    2   Specialty Groups*           1:1    1    1   Family Program           [place]             Phase II             Absent holiday holiday         Total    3    3     *Specialty Groups include Mental Health Care, Assertiveness and Communication, Sobriety Maintenance Skills, Spiritual Care, Stress Management, Relapse Prevention, Family Systems.                  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 Use Disorder Moderate  304.20 (F14.20) Cocaine Use Disorder Severe  305.10 (F17.200)  Tobacco Use Disorder Moderate     1) Care Coordination Activities: Spoke with client's significant other, provided support and coordination of care  2) Medical, Mental Health and other appointments the client attended: None reported   3) Medication issues: None reported  4) Physical and mental health problems: None reported  5) Review and evaluation of the individual abuse prevention plan: NA     [facility] Risk Ratings and Data       DIMENSION 1: Acute Intoxication/Withdrawal  The client's ability to cope with withdrawal symptoms and  "current state of intoxication       Acute Intoxication/Withdrawal - Current Risk Factor:  0    Reporting sober date of 12/22/18    Goals: Develop effective strategies to maintain sobriety.     Data: 12/27/18: Client denied nor demonstrated any signs/symptomology of intoxication and/or withdrawal. Client used meth and marijuana on 12/21/18. UA completed 11/6/2018 NEG all substances. UA completed 12/4/18 NEG all substances. UA completed 12/10/18 NEG all substances. UA completed 12/27/18 POS THC, NEG all other substances.     DIMENSION 2:  Biomedical Conditions and Complaints  The degree to which any physical disorder would interfere with treatment for substance abuse and the client's ability to tolerate any related discomfort     Biomedical Conditions and Complaints - Current Risk Factor:  0    Goals: Disclose CD status to medical providers and follow up with medical interventions while in EOP.     Data: 12/27/18: Client reports attending the following doctor s appointments over the past week: None. Client reports the following changes to his physical health over the past week: \"None\". Client remains capable of autonomous healthcare.     Current Outpatient Medications   Medication     nicotine (NICODERM CQ) 21 MG/24HR 24 hr patch     nicotine polacrilex (COMMIT) 2 MG lozenge     nicotine polacrilex (NICORETTE) 2 MG gum     omeprazole (PRILOSEC) 20 MG CR capsule     No current facility-administered medications for this encounter.      Facility-Administered Medications Ordered in Other Encounters   Medication     Self Administer Medications: Behavioral Services       DIMENSION 3:  Emotional/Behavioral/Cognitive Conditions and Complications  The degree to which any condition or complications are likely to interfere with treatment for substance abuse or with function in significant life areas and the likelihood of risk of harm to self or others.     Emotional/Behavioral - Current Risk Factor:  1    DSM-5 Diagnoses:  " "Unclear- hx of bipolar diagnosis, but reports he suspects he has depression. Mental health diagnoses may have been confounded by substance use. DA may be warranted after client has sufficient sobriety.     Goals: Client identified a goal to learn how to identify emotions and express them in healthy ways, as well as practice \"recognizing what's underneath\". Client identified a goal to find a sense of purpose, and better recognize \"strengths vs weaknesses.\" Learn how to apply self-care and psychotherapy to build a repertoire of daily habits that counteract PAWS, fatigue, depression, anxiety and increase resiliency and well-being.      Data: 12/27/18: Client denied suicidal ideation or self-injurious behavior. Client rated the following MH symptoms on a scale of 1-10 (0=did not endorse): sleeping more than usual- 5, fatigue- 7, difficulty concentrating- 0, restlessness- 0, mood swings- 0, irritability- 0, lack of interest or pleasure in activities- 0, hopelessness- 0, muscle tension- 4, shakiness- 0, sadness- 0, worry/rumination- 0, increased energy- 0, grandiosity- 0, sleeping less than usual- 0, appetite changes- 0, intrusive/distressing memories- 0, difficulty falling or staying asleep- 0.  Client reports that he coped with the aforementioned symptomology by \"physically sleep.\" Client reported he planned to further practice the coping skill of \"breathing\".Client reported a mental health goal this week of: \"only positive self talk\".      DIMENSION 4:  Readiness to Change  Consider the amount of support and encouragement necessary to keep the client involved in treatment.     Readiness to Change - Current Risk Factor:  1    Goals: Client identified a goal to lift his self-esteem.    Data: 12/27/18: When prompted to consider a way client has moved his life forward this week, he reported \"I relapsed, so...\".      DIMENSION 5:  Relapse/Continued Use/Continued Problem Potential  Consider the degree to which the client " "recognizes relapse issues and has the skills to prevent relapse of either substance use or mental health problems.     Relapse/Continued Use/Continued Problem Potential - Current Risk Factor:  3    Goals: Increase insight into motivations and consequences for using, develop alternative methods to address these areas. Identify and address relapse triggers and cues.    Data: 12/27/18: Client rated his cravings this week on a scale of 1-10 (1=none, 10= \"I used\"): 10. Client identified the following triggers that contributed to his cravings: \"running from issues instead of managing\". Client reported a goal to practice the following coping skill further: \"playing tape through\". Lisa reported on 12/20/18, he turned his phone off as he was upset with his significant other and wanted to be away from her for awhile, so he went to a cousin's house. Client reported he did not intend to use at that point, but reported he was \"already in fuck it mode\". Client reported the next day, 12/21/18, a cousin came by who used and he left with him to use. Client reported he did not spend any of his own money on substances, and returned to his family on 12/24/18. Client reported on 12/22/18, he left his using cousin's house and spent some time with a sober friend who helped him get \"cleaned up\". Client reported this relapse is different than in the past as he didn't spend any of his own money, he returned to his family soon, and accepted help from a sober friend. Client reported it is also rare that he is returning to outpatient, as typically he would not do so due to shame.    Intervention: Client was given group time to process his relapse. Client heard feedback from group members regarding his thought process prior to and after using. Counselor and group members identified ways in which client's \"sober mind\" was present even during his relapse: aka he left his cousin's, he didn't spend his own money, he returned home. Counselor " "utilized cognitive behavioral therapy, motivational interviewing techniques. Counselor utilized validation, thought re-framing, self-esteem building and strengths-building techniques.     Assessment: Client appears to be in the Stages of Change Model  Preparation/Determination within the stages of change model. Client appears willing to learn from this relapse and develop an updated plan to avoid further relapses.     Plan: -Meet week of 12/31 to update treatment plan  -Client will complete family meeting with either MARLA Estevez or Conner Alford to create a better plan between himself and significant other.  -Client will discuss couples therapy with significant other, review referrals    DIMENSION 6:  Recovery Environment  Consider the degree to which key areas of the client's life are supportive of or antagonistic to treatment participation and recovery.     Recovery Environment - Current Risk Factor:  3    Goals: Implement and/or improve application of coping skills to avoid relapse. Client identified a goal to improve sober hobbies and interests.    Data: 12/27/18: Client rated their living Situation on a scale of 1-10 (1=completely antagonistic 10=totally supportive): 5. Client reports during the past week that he built their sober support network by \"more outside meetings\". Client reported a plan to build their sober support network this week by \"be honest and go to meetings\".           Diana Steward, [facility] LADC   "

## 2018-12-31 LAB
AMPHET UR CFM-MCNC: 76 NG/ML
AMPHET UR QL CFM: NORMAL
D-METHAMPHET UR CFM-MCNC: >78 %
L-METHAMPHET UR CFM-MCNC: <22 %
METHAMPHET UR CFM-MCNC: 100 NG/ML

## 2019-01-02 ENCOUNTER — HOSPITAL ENCOUNTER (OUTPATIENT)
Dept: BEHAVIORAL HEALTH | Facility: CLINIC | Age: 44
End: 2019-01-02
Attending: SOCIAL WORKER
Payer: COMMERCIAL

## 2019-01-02 PROCEDURE — H2035 A/D TX PROGRAM, PER HOUR: HCPCS | Mod: HQ

## 2019-01-03 ENCOUNTER — HOSPITAL ENCOUNTER (OUTPATIENT)
Dept: BEHAVIORAL HEALTH | Facility: CLINIC | Age: 44
End: 2019-01-03
Attending: SOCIAL WORKER
Payer: COMMERCIAL

## 2019-01-03 PROCEDURE — H2035 A/D TX PROGRAM, PER HOUR: HCPCS

## 2019-01-03 PROCEDURE — H2035 A/D TX PROGRAM, PER HOUR: HCPCS | Mod: HQ
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
## 2019-01-10 ENCOUNTER — TELEPHONE (OUTPATIENT)
Dept: BEHAVIORAL HEALTH | Facility: CLINIC | Age: 44
End: 2019-01-10

## 2019-01-10 NOTE — TELEPHONE ENCOUNTER
Therapist left a voice mail for client to call and check-in. Client did not show for his 4:30 scheduled therapy session. Therapist consulted with client's primary CD counselor, Diana Steward.   MARLA Tamayo

## 2019-01-14 ENCOUNTER — HOSPITAL ENCOUNTER (OUTPATIENT)
Dept: BEHAVIORAL HEALTH | Facility: CLINIC | Age: 44
End: 2019-01-14
Attending: SOCIAL WORKER
Payer: COMMERCIAL

## 2019-01-14 PROCEDURE — H2035 A/D TX PROGRAM, PER HOUR: HCPCS | Mod: HQ

## 2019-01-17 ENCOUNTER — HOSPITAL ENCOUNTER (OUTPATIENT)
Dept: BEHAVIORAL HEALTH | Facility: CLINIC | Age: 44
End: 2019-01-17
Attending: SOCIAL WORKER
Payer: COMMERCIAL

## 2019-01-17 PROCEDURE — H2035 A/D TX PROGRAM, PER HOUR: HCPCS | Mod: HQ

## 2019-01-18 NOTE — PROGRESS NOTES
CD ADULT Progress Note     Treatment Plan Review completed on: 1/17/2019     Attendance Dates: 1/14/19 and 1/17/2019    Total # of P1 Group Sessions: 19  Total # of P2 Group Sessions: 19  Total # of P3 Group Sessions: NA  Total # of 1:1 Sessions: 16    Projected discharge date: 2/12/19 MONDAY TUESDAY WEDNESDAY THURSDAY FRIDAY SATURDAY SUNDAY Total   Group Therapy 2   2    4   Specialty Groups*           1:1  1  1    2   Family Program           [place]             Phase II             Absent           Total 2 1  3    6     *Specialty Groups include Mental Health Care, Assertiveness and Communication, Sobriety Maintenance Skills, Spiritual Care, Stress Management, Relapse Prevention, Family Systems.                  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 Use Disorder Moderate  304.20 (F14.20) Cocaine Use Disorder Severe  305.10 (F17.200)  Tobacco Use Disorder Moderate     1) Care Coordination Activities: Spoke with client's significant other, provided support and coordination of care  2) Medical, Mental Health and other appointments the client attended: None reported   3) Medication issues: None reported  4) Physical and mental health problems: None reported  5) Review and evaluation of the individual abuse prevention plan: NA     [facility] Risk Ratings and Data       DIMENSION 1: Acute Intoxication/Withdrawal  The client's ability to cope with withdrawal symptoms and current state of intoxication       Acute  "Intoxication/Withdrawal - Current Risk Factor:  0    Reporting sober date of 12/22/18    Goals: Develop effective strategies to maintain sobriety.     Data: 1/14/19: Client denied nor demonstrated any signs/symptomology of intoxication and/or withdrawal. Client used meth and marijuana on 12/21/18. UA completed 11/6/2018 NEG all substances. UA completed 12/4/18 NEG all substances. UA completed 12/10/18 NEG all substances. UA completed 12/27/18 POS THC, NEG all other substances. UA completed 1/17/2019 NEG all substances.     DIMENSION 2:  Biomedical Conditions and Complaints  The degree to which any physical disorder would interfere with treatment for substance abuse and the client's ability to tolerate any related discomfort     Biomedical Conditions and Complaints - Current Risk Factor:  0    Goals: Disclose CD status to medical providers and follow up with medical interventions while in EOP.     Data: 1/14/19: Client reports attending the following doctor s appointments over the past week: None. Client reports the following changes to his physical health over the past week: \"None\". Client remains capable of autonomous healthcare.     Current Outpatient Medications   Medication     nicotine (NICODERM CQ) 21 MG/24HR 24 hr patch     nicotine polacrilex (COMMIT) 2 MG lozenge     nicotine polacrilex (NICORETTE) 2 MG gum     omeprazole (PRILOSEC) 20 MG CR capsule     No current facility-administered medications for this encounter.      Facility-Administered Medications Ordered in Other Encounters   Medication     Self Administer Medications: Behavioral Services       DIMENSION 3:  Emotional/Behavioral/Cognitive Conditions and Complications  The degree to which any condition or complications are likely to interfere with treatment for substance abuse or with function in significant life areas and the likelihood of risk of harm to self or others.     Emotional/Behavioral - Current Risk Factor:  1    DSM-5 Diagnoses:  " "Unclear- hx of bipolar diagnosis, but reports he suspects he has depression. Mental health diagnoses may have been confounded by substance use. DA may be warranted after client has sufficient sobriety.     Goals: Client identified a goal to learn how to identify emotions and express them in healthy ways, as well as practice \"recognizing what's underneath\". Client identified a goal to find a sense of purpose, and better recognize \"strengths vs weaknesses.\" Learn how to apply self-care and psychotherapy to build a repertoire of daily habits that counteract PAWS, fatigue, depression, anxiety and increase resiliency and well-being.      Data: 1/14/19: Client denied suicidal ideation or self-injurious behavior. Client rated the following MH symptoms on a scale of 1-10 (0=did not endorse): sleeping more than usual- 5, fatigue- 0, difficulty concentrating- 0, restlessness- 0, mood swings- 0, irritability- 3, lack of interest or pleasure in activities- 0, hopelessness- 0, muscle tension- 0, shakiness- 0, sadness- 0, worry/rumination- 0, increased energy- 0, grandiosity- 0, sleeping less than usual- 0, appetite changes- 0, intrusive/distressing memories- 0, difficulty falling or staying asleep- 0.  Client reports he did not use coping skills for these symptoms. Client reported a mental health goal this week of: \"get back on a good recovery routine\".      DIMENSION 4:  Readiness to Change  Consider the amount of support and encouragement necessary to keep the client involved in treatment.     Readiness to Change - Current Risk Factor:  1    Goals: Client identified a goal to lift his self-esteem.    Data: 1/14/19: When prompted to consider a way client has moved his life forward this week, he reported \"I called Diana back\".     DIMENSION 5:  Relapse/Continued Use/Continued Problem Potential  Consider the degree to which the client recognizes relapse issues and has the skills to prevent relapse of either substance use or " "mental health problems.     Relapse/Continued Use/Continued Problem Potential - Current Risk Factor:  3    Goals: Increase insight into motivations and consequences for using, develop alternative methods to address these areas. Identify and address relapse triggers and cues.    Data: 1/14/19: Client rated his cravings this week on a scale of 1-10 (1=none, 10= \"I used\"): 5. Client identified the following triggers that contributed to his cravings: \"trying to do too much at once\". Client reported using the following coping skills helped minimize his cravings this past week: \"eliminate unnecessary people, places and things.\" Client reported a goal to practice the following coping skill further: \"breathing, exercise, playing the tape through\".      DIMENSION 6:  Recovery Environment  Consider the degree to which key areas of the client's life are supportive of or antagonistic to treatment participation and recovery.     Recovery Environment - Current Risk Factor:  3    Goals: Implement and/or improve application of coping skills to avoid relapse. Client identified a goal to improve sober hobbies and interests.    Data: 1/14/19: Client rated their living Situation on a scale of 1-10 (1=completely antagonistic 10=totally supportive): 5. Client reports that this is due to: \"don't know they want a magic pill\". Client reported a plan to build their sober support network this week by \"simply get back to spiritual studying and recovery routine\".  1/17/2019: Client reported continued arguments with his significant other over finances. Client reported at time feelings he wants to give up on the relationship.     Intervention: Client was given group time to process his relationship issues. Counselor utilized cognitive behavioral therapy, motivational interviewing techniques. Counselor utilized validation, thought re-framing, self-esteem building and strengths-building techniques. 1/14/19: Client participated in a skills group on " distress tolerance skills wherein he learned distress tolerance skills and identified how he would use them. 1/17/2019: Counselor practiced role-playing with the client, coached client on how to respond to his significant other by validating their emotions but also remaining firm with his boundaries.    Assessment: Client appears to be in the Stages of Change Model  Preparation/Determination within the stages of change model. Client appears to continue to be struggling with antagonistic factors in his environment which are increasing his risk of relapse. Client appears motivated to address these factors at this time.     Plan: -Transition client to Phase 1 starting 1/21/19         NELSON Ledesma [facility]

## 2019-01-21 ENCOUNTER — HOSPITAL ENCOUNTER (OUTPATIENT)
Dept: BEHAVIORAL HEALTH | Facility: CLINIC | Age: 44
End: 2019-01-21
Attending: SOCIAL WORKER
Payer: COMMERCIAL

## 2019-01-21 PROCEDURE — H2035 A/D TX PROGRAM, PER HOUR: HCPCS | Mod: HQ

## 2019-01-22 ENCOUNTER — HOSPITAL ENCOUNTER (OUTPATIENT)
Dept: BEHAVIORAL HEALTH | Facility: CLINIC | Age: 44
End: 2019-01-22
Attending: SOCIAL WORKER
Payer: COMMERCIAL

## 2019-01-22 PROCEDURE — H2035 A/D TX PROGRAM, PER HOUR: HCPCS

## 2019-01-22 NOTE — PROGRESS NOTES
"INDIVIDUAL SESSION SUMMARY    D) Met with client on 1/22/19 from 4:00-5:00. Client reported that he has a couples therapy session with Alysa at [facility] in [facility] this Friday 1/25 along with his GF. Client reported he also has a therapy session scheduled for 8:30am on Friday 1/25 with his previous individual therapist, Greta, at [facility] in [place]. Client and therapist discussed a plan to transition away from meeting with this therapist weekly as he resumes session with Greta and starts couples therapy. Client stated that he will continue meeting with his IOP evening group at this location on Monday and Thursday evenings while going to AA/NA meetings close to home on Wed and Fri evenings. For self-care, client reported changing his work schedule to a 5 day week with shorter shifts, going to a meeting last Friday and having been to the gym almost every day since last Friday. Client reported to feel much \"calmer\". Client shared having a successful conversation with his GF about finances and budgeting and that they have plans to talk more this coming weekend. Client spoke about re-connecting with his GF on the weekend while having fun bowling.     I) Individual session with client. Provided client with verbal interventions including: validation, nurturing, compassion and support. Discussed the importance of recovery behaviors such as utilizing sponsorship, sober support network, going to meetings, daily rituals, and goal setting. Highlighted client's strengths including: showing vulnerability with GF by vocalizing his needs.      A) Client apears to struggle with recognizing how stress affects him and has a tendency to give the appearance of being calm even when he's under a lot of stress. Client has taken steps to connect with sober support outside of treatment. Client has taken steps to repair and actively work on communication with is GF.     P) Next session is scheduled for " 1/28/19.  Melissa Thomas, LMFT  1/22/2019

## 2019-01-23 NOTE — PROGRESS NOTES
CD ADULT Progress Note     Treatment Plan Review completed on: 1/22/19     Attendance Dates: 1/21/19    Total # of P1 Group Sessions: 19  Total # of P2 Group Sessions: 20  Total # of P3 Group Sessions: NA  Total # of 1:1 Sessions: 17    Projected discharge date: 2/12/19 MONDAY TUESDAY WEDNESDAY THURSDAY FRIDAY SATURDAY SUNDAY Total   Group Therapy 2       2   Specialty Groups*           1:1  1      1   Family Program           [address]             Phase II             Absent           Total 2 1      3     *Specialty Groups include Mental Health Care, Assertiveness and Communication, Sobriety Maintenance Skills, Spiritual Care, Stress Management, Relapse Prevention, Family Systems.                  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 Use Disorder Moderate  304.20 (F14.20) Cocaine Use Disorder Severe  305.10 (F17.200)  Tobacco Use Disorder Moderate     1) Care Coordination Activities: Coordinating transition to KIRTI Briceno   2) Medical, Mental Health and other appointments the client attended: None reported   3) Medication issues: None reported  4) Physical and mental health problems: None reported  5) Review and evaluation of the individual abuse prevention plan: NA     ASAM Risk Ratings and Data       DIMENSION 1: Acute Intoxication/Withdrawal  The client's ability to cope with withdrawal symptoms and current state of intoxication       Acute Intoxication/Withdrawal - Current Risk Factor:  0    Reporting sober  "date of 12/22/18    Goals: Develop effective strategies to maintain sobriety.     Data: 1/21/19: Client denied nor demonstrated any signs/symptomology of intoxication and/or withdrawal. Client used meth and marijuana on 12/21/18. UA completed 11/6/2018 NEG all substances. UA completed 12/4/18 NEG all substances. UA completed 12/10/18 NEG all substances. UA completed 12/27/18 POS THC, NEG all other substances. UA completed 1/17/2019 NEG all substances.     DIMENSION 2:  Biomedical Conditions and Complaints  The degree to which any physical disorder would interfere with treatment for substance abuse and the client's ability to tolerate any related discomfort     Biomedical Conditions and Complaints - Current Risk Factor:  0    Goals: Disclose CD status to medical providers and follow up with medical interventions while in EOP.     Data: 1/21/19: Client reports attending the following doctor s appointments over the past week: None. Client reports the following changes to his physical health over the past week: \"None\". Client remains capable of autonomous healthcare.     Current Outpatient Medications   Medication     nicotine (NICODERM CQ) 21 MG/24HR 24 hr patch     nicotine polacrilex (COMMIT) 2 MG lozenge     nicotine polacrilex (NICORETTE) 2 MG gum     omeprazole (PRILOSEC) 20 MG CR capsule     No current facility-administered medications for this encounter.      Facility-Administered Medications Ordered in Other Encounters   Medication     Self Administer Medications: Behavioral Services       DIMENSION 3:  Emotional/Behavioral/Cognitive Conditions and Complications  The degree to which any condition or complications are likely to interfere with treatment for substance abuse or with function in significant life areas and the likelihood of risk of harm to self or others.     Emotional/Behavioral - Current Risk Factor:  1    DSM-5 Diagnoses:  Unclear- hx of bipolar diagnosis, but reports he suspects he has " "depression. Mental health diagnoses may have been confounded by substance use. DA may be warranted after client has sufficient sobriety.     Goals: Client identified a goal to learn how to identify emotions and express them in healthy ways, as well as practice \"recognizing what's underneath\". Client identified a goal to find a sense of purpose, and better recognize \"strengths vs weaknesses.\" Learn how to apply self-care and psychotherapy to build a repertoire of daily habits that counteract PAWS, fatigue, depression, anxiety and increase resiliency and well-being.      Data: 1/21/19: Client denied suicidal ideation or self-injurious behavior. Client reported no MH symptoms this week.  Client reports that he coped with the aforementioned symptomology by \"breathing and STOP.\" Client reported a mental health goal this week of: \"stay in moment\".      DIMENSION 4:  Readiness to Change  Consider the amount of support and encouragement necessary to keep the client involved in treatment.     Readiness to Change - Current Risk Factor:  1    Goals: Client identified a goal to lift his self-esteem.    Data: 1/21/19: When prompted to consider a way client has moved his life forward this week, he reported \"eliminated a lot of stressors and got back into gym\".     DIMENSION 5:  Relapse/Continued Use/Continued Problem Potential  Consider the degree to which the client recognizes relapse issues and has the skills to prevent relapse of either substance use or mental health problems.     Relapse/Continued Use/Continued Problem Potential - Current Risk Factor:  3    Goals: Increase insight into motivations and consequences for using, develop alternative methods to address these areas. Identify and address relapse triggers and cues.    Data: 1/21/19: Client rated his cravings this week on a scale of 1-10 (1=none, 10= \"I used\"): 1. Client identified the following triggers that contributed to his cravings: \"miscommunication with " "partner\". Client reported using the following coping skills helped minimize his cravings this past week: \"playing tape through.\" Client reported a goal to practice the following coping skill further: \"self-soothe with 5 senses\".      DIMENSION 6:  Recovery Environment  Consider the degree to which key areas of the client's life are supportive of or antagonistic to treatment participation and recovery.     Recovery Environment - Current Risk Factor:  3    Goals: Implement and/or improve application of coping skills to avoid relapse. Client identified a goal to improve sober hobbies and interests.    Data: 1/21/19: Client rated their living Situation on a scale of 1-10 (1=completely antagonistic 10=totally supportive): 7. Client reports that this is due to: \"I think they are catching on\". Client reports during the past week that he built their sober support network by identifying meetings he wants to attend and \"got back on sober grid\". Client reported a plan to build their sober support network this week by \"more meetings\". Client reported he is communicating better with his significant other. Client reported they have created a plan for him to have better access to his finances and be a more equal part in the decision making process with money and bills. Client reported he and his significant other scheduled couples therapy for Friday 1/25.     Intervention: Client was given group time to process the progress made at home. Counselor utilized cognitive behavioral therapy, motivational interviewing techniques. Counselor utilized validation, thought re-framing, self-esteem building and strengths-building techniques. Client and counselor created plan for client's transition to a higher level of care to better address his recent use episodes.     Assessment: Client appears to be in the Stages of Change Model  Action within the stages of change model. Client appears to be making positive steps to address the risk factors " that contributed to his recent use episodes.     Plan: -Transition client to Phase 2 with KIRTI Briceno  -Client will begin EMDR therapy 1/25  -Client will begin couples therapy 1/25  -Client will have limited access to finances  -Client will attend 2 sober support groups weekly         NELSON Ledesma

## 2019-01-23 NOTE — ADDENDUM NOTE
Encounter addended by: Diana Steward LADC on: 1/23/2019 11:50 AM   Actions taken: Sign clinical note

## 2019-01-24 ENCOUNTER — HOSPITAL ENCOUNTER (OUTPATIENT)
Dept: BEHAVIORAL HEALTH | Facility: CLINIC | Age: 44
End: 2019-01-24
Attending: SOCIAL WORKER
Payer: COMMERCIAL

## 2019-01-24 PROCEDURE — H2035 A/D TX PROGRAM, PER HOUR: HCPCS | Mod: HQ

## 2019-01-25 NOTE — PROGRESS NOTES
CD ADULT Progress Note     Treatment Plan Review completed on: 1/25/19     Attendance Dates: 1/25/19    Total # of P1 Group Sessions: 19  Total # of P2 Group Sessions: 21  Total # of P3 Group Sessions: NA  Total # of 1:1 Sessions: 17    Projected discharge date: 2/12/19 MONDAY TUESDAY WEDNESDAY THURSDAY FRIDAY SATURDAY SUNDAY Total   Group Therapy    2    2   Specialty Groups*           1:1           Family Program           [place]             Phase II             Absent           Total        2     *Specialty Groups include Mental Health Care, Assertiveness and Communication, Sobriety Maintenance Skills, Spiritual Care, Stress Management, Relapse Prevention, Family Systems.                  Learning Style:  Hands on  Verbal  Reading    Staff member contributing: Marli Houston MS, [facility]    Received supervision:  No    Client: contributed to goals and plan    Did Client receive a copy of treatment plan/revised plan: Yes, signed 10/15/18     Changes to Treatment Plan: No    Client agrees with plan/revised plan: Yes- signed 10/15/18    Any changes in Vulnerable Adult Status:  No    Substance Use Disorders:   305.00 (F10.10) Alcohol Use Disorder Mild  305.20 (F12.10) Cannabis Use Disorder Mild  304.40 (F15.20) Amphetamine Use Disorder Moderate  304.20 (F14.20) Cocaine Use Disorder Severe  305.10 (F17.200)  Tobacco Use Disorder Moderate     1) Care Coordination Activities: NELSON Aleman, [facility] and Melissa Thomas psychotherapist  2) Medical, Mental Health and other appointments the client attended: None reported   3) Medication issues: None reported  4) Physical and mental health problems: None reported  5) Review and evaluation of the individual abuse prevention plan: NA     [facility] Risk Ratings and Data       DIMENSION 1: Acute Intoxication/Withdrawal  The client's ability to cope with withdrawal symptoms and current state of intoxication       Acute Intoxication/Withdrawal - Current Risk Factor:   "0    Reporting sober date of 1.6.2019    Goals: Develop effective strategies to maintain sobriety.     Data: 1/25/19: Client denied nor demonstrated any signs/symptomology of intoxication and/or withdrawal. Client used meth and marijuana on 12/21/18. UA completed 11/6/2018 NEG all substances. UA completed 12/4/18 NEG all substances. UA completed 12/10/18 NEG all substances. UA completed 12/27/18 POS THC, NEG all other substances. UA completed 1/17/2019 NEG all substances. Client reports date of last use as 1.5.2019.    DIMENSION 2:  Biomedical Conditions and Complaints  The degree to which any physical disorder would interfere with treatment for substance abuse and the client's ability to tolerate any related discomfort     Biomedical Conditions and Complaints - Current Risk Factor:  0    Goals: Disclose CD status to medical providers and follow up with medical interventions while in EOP.     Data: 1/25/19: Client reports attending the following doctor s appointments over the past week: None. Client reports the following changes to his physical health over the past week: \"weight loss\". Client remains capable of autonomous healthcare.     Current Outpatient Medications   Medication     nicotine (NICODERM CQ) 21 MG/24HR 24 hr patch     nicotine polacrilex (COMMIT) 2 MG lozenge     nicotine polacrilex (NICORETTE) 2 MG gum     omeprazole (PRILOSEC) 20 MG CR capsule     No current facility-administered medications for this encounter.      Facility-Administered Medications Ordered in Other Encounters   Medication     Self Administer Medications: Behavioral Services       DIMENSION 3:  Emotional/Behavioral/Cognitive Conditions and Complications  The degree to which any condition or complications are likely to interfere with treatment for substance abuse or with function in significant life areas and the likelihood of risk of harm to self or others.     Emotional/Behavioral - Current Risk Factor:  1    DSM-5 Diagnoses:  " "Unclear- hx of bipolar diagnosis, but reports he suspects he has depression. Mental health diagnoses may have been confounded by substance use. DA may be warranted after client has sufficient sobriety.     Goals: Client identified a goal to learn how to identify emotions and express them in healthy ways, as well as practice \"recognizing what's underneath\". Client identified a goal to find a sense of purpose, and better recognize \"strengths vs weaknesses.\" Learn how to apply self-care and psychotherapy to build a repertoire of daily habits that counteract PAWS, fatigue, depression, anxiety and increase resiliency and well-being.      Data: 1/25/19: Client denied suicidal ideation or self-injurious behavior. Client reported no MH symptoms this week. Client reports feelings of \"cautious and being open-minded)\". Client reports 2 life stressors as \"negative people and situations\". Client reports that he coped with the aforementioned symptomology by \"staying away.\" Client reported that he will begin coules counseling with Alysa Blackburn at [facility] in [place]. Client signed BENSON for care coordination between writer and couples counselor. Client reported a mental health goal this week of: attend couples counseling on 1.26.2019.      DIMENSION 4:  Readiness to Change  Consider the amount of support and encouragement necessary to keep the client involved in treatment.     Readiness to Change - Current Risk Factor:  1    Goals: Client identified a goal to lift his self-esteem.    Data: 1/25/19: Client rates his motivation for his recovery (1: no motivation - 10: very motivated): 10. Client reports his main motivation for his sobriety is his \"sprituality\". Client reports that \"his significant other\" blocks his recovery.     DIMENSION 5:  Relapse/Continued Use/Continued Problem Potential  Consider the degree to which the client recognizes relapse issues and has the skills to prevent relapse of either substance use " "or mental health problems.     Relapse/Continued Use/Continued Problem Potential - Current Risk Factor:  3    Goals: Increase insight into motivations and consequences for using, develop alternative methods to address these areas. Identify and address relapse triggers and cues.    Data: 1/25/19: Client rated his cravings this week on a scale of 1-10 (1=none, 10= \"I used\"): 1. Client identified the following triggers that contributed to his cravings: \"none\". Client reported using the following coping skills helped minimize his cravings this past week: \"staying busy.\" Client reported a goal to practice the following coping skill further: \"self-soothe with 5 senses\". Client reports sober activities of: movies and going to the gym.       DIMENSION 6:  Recovery Environment  Consider the degree to which key areas of the client's life are supportive of or antagonistic to treatment participation and recovery.     Recovery Environment - Current Risk Factor:  3    Goals: Implement and/or improve application of coping skills to avoid relapse. Client identified a goal to improve sober hobbies and interests.    Data: 1/25/19: Client rated their living Situation on a scale of 1-10 (1=completely antagonistic 10=totally supportive): 5. Client reports that this is due to: \"I think they are catching on\". Client reports during the past week that he built their sober support network by identifying meetings he wants to attend and \"got back on sober grid\". Client reported a plan to build their sober support network this week by \"more meetings\". Client reports that he is still struggling with communication with his significant other. Client reported he and his significant other scheduled couples therapy for Friday 1/25.         Plan: -Transition client to Phase 2 with KIRTI Briceno  -Client will begin EMDR therapy 1/25  -Client will begin couples therapy 1/25  -Client will have limited access to finances  -Client will attend 2 " sober support groups weekly         Client is new to writer's group. Client reports he has assignments to present and will do so next week. Client was able to introduce himself and discuss why he is in treatment and what his motivations are.       Marli Houston, MS, [facility]
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
## 2019-02-04 ENCOUNTER — TELEPHONE (OUTPATIENT)
Dept: BEHAVIORAL HEALTH | Facility: CLINIC | Age: 44
End: 2019-02-04

## 2019-02-04 NOTE — TELEPHONE ENCOUNTER
Client did not show for his 4:30pm session. Client did not contact this therapist.   Therapist left a voice mail for the client to call back and discuss the missed appointment.   MARLA Tamayo

## 2019-02-11 ENCOUNTER — TELEPHONE (OUTPATIENT)
Dept: BEHAVIORAL HEALTH | Facility: CLINIC | Age: 44
End: 2019-02-11

## 2019-02-18 ENCOUNTER — TELEPHONE (OUTPATIENT)
Dept: BEHAVIORAL HEALTH | Facility: CLINIC | Age: 44
End: 2019-02-18

## 2019-02-25 ENCOUNTER — TELEPHONE (OUTPATIENT)
Dept: BEHAVIORAL HEALTH | Facility: CLINIC | Age: 44
End: 2019-02-25

## 2019-02-25 NOTE — TELEPHONE ENCOUNTER
Client's therapist, Greta Tello, left a message stating she had seen the client Friday 1/25/19 and is scheduled to see him again 3/8/19. She asked that we let her know if he was discharged from our program. This therapist called her back and left a voice mail indicating that the client was discharged from our program on 2/22/19.   MARLA Tamayo

## 2019-10-05 ENCOUNTER — HEALTH MAINTENANCE LETTER (OUTPATIENT)
Age: 44
End: 2019-10-05

## 2020-11-14 ENCOUNTER — HEALTH MAINTENANCE LETTER (OUTPATIENT)
Age: 45
End: 2020-11-14

## 2021-09-12 ENCOUNTER — HEALTH MAINTENANCE LETTER (OUTPATIENT)
Age: 46
End: 2021-09-12

## 2022-01-02 ENCOUNTER — HEALTH MAINTENANCE LETTER (OUTPATIENT)
Age: 47
End: 2022-01-02

## 2022-11-19 ENCOUNTER — HEALTH MAINTENANCE LETTER (OUTPATIENT)
Age: 47
End: 2022-11-19

## 2023-04-09 ENCOUNTER — HEALTH MAINTENANCE LETTER (OUTPATIENT)
Age: 48
End: 2023-04-09